# Patient Record
Sex: FEMALE | Race: WHITE | NOT HISPANIC OR LATINO | Employment: OTHER | URBAN - METROPOLITAN AREA
[De-identification: names, ages, dates, MRNs, and addresses within clinical notes are randomized per-mention and may not be internally consistent; named-entity substitution may affect disease eponyms.]

---

## 2017-01-23 RX ORDER — METRONIDAZOLE 500 MG/1
500 TABLET ORAL 2 TIMES DAILY
Qty: 14 TABLET | Refills: 0 | Status: SHIPPED | OUTPATIENT
Start: 2017-01-23 | End: 2017-01-30

## 2017-01-23 NOTE — TELEPHONE ENCOUNTER
Pt notified rx has been sent to pharmacy.  Advised no alcohol consumption while taking medication.  Aware if not better after taking medication she will need an appt.

## 2017-01-23 NOTE — TELEPHONE ENCOUNTER
Pt states she has an odor.  Last time this happened she was given Flagyl.  She does not have insurance until Feb 1.  Scheduled annual 3/1.

## 2017-04-05 ENCOUNTER — OFFICE VISIT (OUTPATIENT)
Dept: OBSTETRICS AND GYNECOLOGY | Facility: CLINIC | Age: 52
End: 2017-04-05
Payer: COMMERCIAL

## 2017-04-05 VITALS
BODY MASS INDEX: 31.57 KG/M2 | SYSTOLIC BLOOD PRESSURE: 114 MMHG | HEIGHT: 65 IN | DIASTOLIC BLOOD PRESSURE: 72 MMHG | WEIGHT: 189.5 LBS

## 2017-04-05 DIAGNOSIS — Z11.51 SCREENING FOR HPV (HUMAN PAPILLOMAVIRUS): ICD-10-CM

## 2017-04-05 DIAGNOSIS — Z12.4 SCREENING FOR CERVICAL CANCER: ICD-10-CM

## 2017-04-05 DIAGNOSIS — Z01.419 ENCOUNTER FOR GYNECOLOGICAL EXAMINATION: Primary | ICD-10-CM

## 2017-04-05 DIAGNOSIS — R10.32 LLQ PAIN: ICD-10-CM

## 2017-04-05 DIAGNOSIS — N76.0 VULVOVAGINITIS: ICD-10-CM

## 2017-04-05 DIAGNOSIS — Z12.31 VISIT FOR SCREENING MAMMOGRAM: ICD-10-CM

## 2017-04-05 PROCEDURE — 99396 PREV VISIT EST AGE 40-64: CPT | Mod: S$GLB,,, | Performed by: OBSTETRICS & GYNECOLOGY

## 2017-04-05 PROCEDURE — 87480 CANDIDA DNA DIR PROBE: CPT

## 2017-04-05 PROCEDURE — 87624 HPV HI-RISK TYP POOLED RSLT: CPT

## 2017-04-05 PROCEDURE — 99999 PR PBB SHADOW E&M-EST. PATIENT-LVL II: CPT | Mod: PBBFAC,,, | Performed by: OBSTETRICS & GYNECOLOGY

## 2017-04-05 PROCEDURE — 88175 CYTOPATH C/V AUTO FLUID REDO: CPT

## 2017-04-05 RX ORDER — FEXOFENADINE HCL AND PSEUDOEPHEDRINE HCI 180; 240 MG/1; MG/1
1 TABLET, EXTENDED RELEASE ORAL
COMMUNITY

## 2017-04-05 RX ORDER — MOMETASONE FUROATE 1 MG/G
CREAM TOPICAL
Refills: 2 | COMMUNITY
Start: 2017-02-21 | End: 2018-08-24

## 2017-04-05 RX ORDER — LEVOTHYROXINE SODIUM 175 UG/1
TABLET ORAL
Refills: 5 | COMMUNITY
Start: 2017-03-03 | End: 2017-11-27

## 2017-04-05 RX ORDER — IBUPROFEN 200 MG
200 TABLET ORAL EVERY 6 HOURS PRN
COMMUNITY
End: 2019-01-15

## 2017-04-05 NOTE — MR AVS SNAPSHOT
[facility]  [address]  [place] [address]  Phone: [phone]  Fax: [phone]                  Lola Self   2017 10:30 AM   Office Visit    Description:  Female : 1965   Provider:  Luna Mcnamara MD   Department:  [facility]           Reason for Visit     Well Woman           Diagnoses this Visit        Comments    Encounter for gynecological examination    -  Primary     Screening for cervical cancer         Screening for HPV (human papillomavirus)         Visit for screening mammogram         LLQ pain         Vulvovaginitis                To Do List           Future Appointments        Provider Department Dept Phone    4/10/2017 12:00 PM METH MAMMO1 [facility] [phone]    2017 1:00 PM [facility], WOMEN'S ULTRASOUND [facility] Ultrasound [phone]    2017 2:15 PM Luna Mcnamara MD [facility] [phone]      Goals (5 Years of Data)     None      Follow-Up and Disposition     Return in about 1 year (around 2018).    Follow-up and Disposition History      [facility]     [facility] On Call Nurse Care Line -  Assistance  Unless otherwise directed by your provider, please contact [facility], our nurse care line that is available for  assistance.     Registered nurses in the [facility] provide: appointment scheduling, clinical advisement, health education, and other advisory services.  Call: [phone] (toll free)               Medications           Message regarding Medications     Verify the changes and/or additions to your medication regime listed below are the same as discussed with your clinician today.  If any of these changes or additions are incorrect, please notify your healthcare provider.             Verify that the below list of medications is an accurate representation of the medications you are currently taking.  If none reported, the  "list may be blank. If incorrect, please contact your healthcare provider. Carry this list with you in case of emergency.           Current Medications     cyclobenzaprine (FLEXERIL) 5 MG tablet 1-2 tabs po TID prn pain    fexofenadine-pseudoephedrine (ALLEGRA-D 24) 180-240 mg per 24 hr tablet Take 1 tablet by mouth once daily.    hydroxychloroquine (PLAQUENIL) 200 mg tablet Take 200 mg by mouth 2 (two) times daily.    ibuprofen (ADVIL,MOTRIN) 200 MG tablet Take 200 mg by mouth every 6 (six) hours as needed for Pain.    levothyroxine (SYNTHROID, LEVOTHROID) 175 MCG tablet TK ONE T PO QD    mometasone 0.1% (ELOCON) 0.1 % cream APPLY 2 TIMES A DAY TO EARS AND NECK AS NEEDED FOR REDNESS AND SCALING. USE SPARINGLY.           Clinical Reference Information           Your Vitals Were     BP Height Weight Last Period BMI    114/72 5' 5" (1.651 m) 86 kg (189 lb 7.8 oz) 03/08/2017 (Exact Date) 31.53 kg/m2      Blood Pressure          Most Recent Value    BP  114/72      Allergies as of 4/5/2017     Morphine      Immunizations Administered on Date of Encounter - 4/5/2017     None      Orders Placed During Today's Visit      Normal Orders This Visit    HPV DNA probe, amplified     Liquid-based pap smear, screening     Vaginosis Screen by DNA Probe     Future Labs/Procedures Expected by Expires    US Pelvis Comp with Transvag NON-OB (xpd  4/5/2017 4/5/2018      Language Assistance Services     ATTENTION: Language assistance services are available, free of charge. Please call [phone].      ATENCIÓN: Si habla negrito, tiene a song disposición servicios gratuitos de asistencia lingüística. Llame al [phone].     [facility] Ý: N?u b?n nói Ti?ng Vi?t, có các d?ch v? h? tr? ngôn ng? mi?n phí dành cho b?n. G?i s? [phone].         [facility] complies with applicable Federal civil rights laws and does not discriminate on the basis of race, color, national origin, age, disability, or sex.        "

## 2017-04-05 NOTE — PROGRESS NOTES
Subjective:       Patient ID: Lola Self is a 51 y.o. female.    Chief Complaint:  Well Woman (Annual Exam  --  Last Pap 9-22-15, Negative/ Hpv Neg   ---   MMG , Normal   ---  )      History of Present Illness.  Lola Self is a 51 y.o. female.  She has no breast or urinary symptoms.  She has no menorrhagia, oligomenorrhea, bleeding betweeen menses, postcoital bleeding, dysmenorrhea, pelvic pain, dyspareunia, vaginal dryness, vaginal discharge, or sexual complaints.  She complians of LLQ pressure off/on x 1 year.  No aggravating/alleviating factors.  No other symptoms.  She also says she thinks she may have BV again.  Recently self treated.    GYN & OB History  Patient's last menstrual period was 2017 (exact date).   Date of Last Pap: 9/22/15 Normal HPV negative  Date of last mammogram: 9/30/15 Normal    OB History    Para Term  AB SAB TAB Ectopic Multiple Living   2 2 2 0 0 0 0 0  2      # Outcome Date GA Lbr Taj/2nd Weight Sex Delivery Anes PTL Lv   2 Term 12/15/04 39w0d  4.139 kg (9 lb 2 oz) M CS-LTranv Spinal  Y   1 Term 97 43w0d  4.196 kg (9 lb 4 oz) M Vag-Spont Spinal  Y      Obstetric Comments   Menarche ~ 16       Past Medical History:   Diagnosis Date    Abnormal Pap smear of cervix 2014    Hpv +     Acid reflux     DUB (dysfunctional uterine bleeding)     History of endometriosis     History of HPV infection 2014    Pap Hpv +    Neck and shoulder pain     Rheumatoid arthritis     Seasonal allergies     Thyroid disease     Hypo     Past Surgical History:   Procedure Laterality Date    APPENDECTOMY  1985     SECTION  , 2004    x 2     CHOLECYSTECTOMY  2009    DILATION AND CURETTAGE OF UTERUS  08/15/2013    DUB    ENDOMETRIAL ABLATION N/A 08/15/2013    [facility] -- Dr Mcnamara    ENDOMETRIAL BIOPSY      PELVIC LAPAROSCOPY  1994 and   Dr Ferro    TUBAL LIGATION  2004     Family History   Problem  Relation Age of Onset    Hypertension Maternal Grandmother     Heart disease Maternal Grandmother     Hypertension Sister     Hypertension Brother     Heart disease Maternal Grandfather 42     death due to MI    Cancer Paternal Grandmother     Cancer Paternal Grandfather      liver cancer    Stroke Brother     Seizures Mother     Hypertension Mother     Thyroid disease Mother     Alzheimer's disease Mother     Cancer Father      melanoma    Heart disease Father     Hyperlipidemia Father     Melanoma Father     Lung cancer Father     Breast cancer Neg Hx     Colon cancer Neg Hx     Ovarian cancer Neg Hx      Social History   Substance Use Topics    Smoking status: Never Smoker    Smokeless tobacco: Never Used    Alcohol use 0.6 oz/week     1 Glasses of wine, 0 Standard drinks or equivalent per week      Comment: socially       Current Outpatient Prescriptions:     cyclobenzaprine (FLEXERIL) 5 MG tablet, 1-2 tabs po TID prn pain, Disp: 30 tablet, Rfl: 2    fexofenadine-pseudoephedrine (ALLEGRA-D 24) 180-240 mg per 24 hr tablet, Take 1 tablet by mouth once daily., Disp: , Rfl:     hydroxychloroquine (PLAQUENIL) 200 mg tablet, Take 200 mg by mouth 2 (two) times daily., Disp: , Rfl:     ibuprofen (ADVIL,MOTRIN) 200 MG tablet, Take 200 mg by mouth every 6 (six) hours as needed for Pain., Disp: , Rfl:     levothyroxine (SYNTHROID, LEVOTHROID) 175 MCG tablet, TK ONE T PO QD, Disp: , Rfl: 5    mometasone 0.1% (ELOCON) 0.1 % cream, APPLY 2 TIMES A DAY TO EARS AND NECK AS NEEDED FOR REDNESS AND SCALING. USE SPARINGLY., Disp: , Rfl: 2    Review of patient's allergies indicates:   Allergen Reactions    Morphine Shortness Of Breath     Other reaction(s): Asthma/Shortness of Breath       Review of Systems  Review of Systems   Constitutional: Negative for fatigue.   HENT: Negative for trouble swallowing.    Eyes: Negative for visual disturbance.   Respiratory: Negative for cough and shortness of  "breath.    Cardiovascular: Negative for chest pain.   Gastrointestinal: Negative for abdominal distention, abdominal pain, blood in stool, nausea and vomiting.   Genitourinary: Negative for difficulty urinating, dyspareunia, dysuria, flank pain, frequency, hematuria, pelvic pain, urgency, vaginal bleeding, vaginal discharge and vaginal pain.   Musculoskeletal: Negative for arthralgias.   Skin: Negative for rash.   Neurological: Negative for dizziness and headaches.   Psychiatric/Behavioral: Negative for sleep disturbance. The patient is not nervous/anxious.         Objective:     Vitals:    04/05/17 1102   BP: 114/72   Weight: 86 kg (189 lb 7.8 oz)   Height: 5' 5" (1.651 m)     Body mass index is 31.53 kg/(m^2).    Physical Exam:   Constitutional: She is oriented to person, place, and time. Vital signs are normal. She appears well-developed and well-nourished.    HENT:   Head: Normocephalic.     Neck: Normal range of motion. No thyromegaly present.     Pulmonary/Chest: Right breast exhibits no mass, no nipple discharge, no skin change, no tenderness and no swelling. Left breast exhibits no mass, no nipple discharge, no skin change, no tenderness and no swelling. Breasts are symmetrical.        Abdominal: Soft. Normal appearance and bowel sounds are normal. She exhibits no distension. There is no tenderness.     Genitourinary: Vagina normal and uterus normal. Pelvic exam was performed with patient supine. There is no rash, tenderness, lesion or injury on the right labia. There is no rash, tenderness, lesion or injury on the left labia. Cervix is normal. Right adnexum displays no mass, no tenderness and no fullness. Left adnexum displays no mass, no tenderness and no fullness. No erythema in the vagina. No vaginal discharge found. Cervix exhibits no motion tenderness and no discharge.           Musculoskeletal: Normal range of motion.      Lymphadenopathy:        Right: No inguinal and no supraclavicular adenopathy " present.        Left: No inguinal and no supraclavicular adenopathy present.    Neurological: She is alert and oriented to person, place, and time.    Skin: Skin is warm and dry.    Psychiatric: She has a normal mood and affect.        Assessment/ Plan:   Encounter for gynecological examination    Screening for cervical cancer  -     Liquid-based pap smear, screening    Screening for HPV (human papillomavirus)  -     HPV DNA probe, amplified    Visit for screening mammogram    LLQ pain  -     US Pelvis Comp with Transvag NON-OB (xpd; Future; Expected date: 4/5/17    Vulvovaginitis  -     Vaginosis Screen by DNA Probe        Routine pap smears.  Self breast exam and routine mammography discussed.  Diet and exercise discussed.  Contraception reviewed/discussed.  Follow-up with me in 1 year.

## 2017-04-06 LAB
CANDIDA RRNA VAG QL PROBE: NEGATIVE
G VAGINALIS RRNA GENITAL QL PROBE: NEGATIVE
T VAGINALIS RRNA GENITAL QL PROBE: NEGATIVE

## 2017-04-10 ENCOUNTER — HOSPITAL ENCOUNTER (OUTPATIENT)
Dept: RADIOLOGY | Facility: HOSPITAL | Age: 52
Discharge: HOME | End: 2017-04-10
Attending: INTERNAL MEDICINE
Payer: COMMERCIAL

## 2017-04-10 DIAGNOSIS — Z12.31 OTHER SCREENING MAMMOGRAM: ICD-10-CM

## 2017-04-10 PROCEDURE — 77067 SCR MAMMO BI INCL CAD: CPT | Mod: TC

## 2017-04-10 PROCEDURE — 77063 BREAST TOMOSYNTHESIS BI: CPT | Mod: 26,,, | Performed by: RADIOLOGY

## 2017-04-10 PROCEDURE — 77067 SCR MAMMO BI INCL CAD: CPT | Mod: 26,,, | Performed by: RADIOLOGY

## 2017-04-11 LAB
HPV16 DNA SPEC QL NAA+PROBE: NEGATIVE
HPV16+18+H RISK 12 DNA CVX-IMP: NEGATIVE
HPV18 DNA SPEC QL NAA+PROBE: NEGATIVE

## 2017-04-25 ENCOUNTER — OFFICE VISIT (OUTPATIENT)
Dept: OBSTETRICS AND GYNECOLOGY | Facility: CLINIC | Age: 52
End: 2017-04-25
Payer: COMMERCIAL

## 2017-04-25 VITALS
WEIGHT: 185 LBS | HEIGHT: 65 IN | SYSTOLIC BLOOD PRESSURE: 120 MMHG | DIASTOLIC BLOOD PRESSURE: 70 MMHG | BODY MASS INDEX: 30.82 KG/M2

## 2017-04-25 DIAGNOSIS — R10.32 LLQ PAIN: Primary | ICD-10-CM

## 2017-04-25 DIAGNOSIS — D25.1 FIBROIDS, INTRAMURAL: ICD-10-CM

## 2017-04-25 LAB
BILIRUB SERPL-MCNC: NORMAL MG/DL
BLOOD URINE, POC: NORMAL
COLOR, POC UA: NORMAL
GLUCOSE UR QL STRIP: NORMAL
KETONES UR QL STRIP: NORMAL
LEUKOCYTE ESTERASE URINE, POC: NORMAL
NITRITE, POC UA: NORMAL
PH, POC UA: 5
PROTEIN, POC: NORMAL
SPECIFIC GRAVITY, POC UA: 1
UROBILINOGEN, POC UA: NORMAL

## 2017-04-25 PROCEDURE — 81002 URINALYSIS NONAUTO W/O SCOPE: CPT | Mod: S$GLB,,, | Performed by: OBSTETRICS & GYNECOLOGY

## 2017-04-25 PROCEDURE — 99999 PR PBB SHADOW E&M-EST. PATIENT-LVL III: CPT | Mod: PBBFAC,,, | Performed by: OBSTETRICS & GYNECOLOGY

## 2017-04-25 PROCEDURE — 99213 OFFICE O/P EST LOW 20 MIN: CPT | Mod: 25,S$GLB,, | Performed by: OBSTETRICS & GYNECOLOGY

## 2017-04-25 PROCEDURE — 1160F RVW MEDS BY RX/DR IN RCRD: CPT | Mod: S$GLB,,, | Performed by: OBSTETRICS & GYNECOLOGY

## 2017-04-25 NOTE — PROGRESS NOTES
Pt is 51 y.o. here for evaluation of pelvic pain. The pain is described as pulling, and is 4/10 in intensity. Pain is located in the LLQ area without radiation.   It occurs off and on but she is able to function. Onset was gradual occurring 1 year ago. Symptoms have been unchanged since. Aggravating factors: none. Alleviating factors: none. Associated symptoms: none. The patient denies constipation, diarrhea, fever, nausea and vomiting. Risk factors for pelvic/abdominal pain include prior pelvic surgery and a history of endometriosis.  Patient's last menstrual period was 2017.  Ultrasound today shows 2 small intramural fibroids only.  UA negative today.    OB History    Para Term  AB SAB TAB Ectopic Multiple Living   2 2 2 0 0 0 0 0  2      # Outcome Date GA Lbr Taj/2nd Weight Sex Delivery Anes PTL Lv   2 Term 12/15/04 39w0d  4.139 kg (9 lb 2 oz) M CS-LTranv Spinal  Y   1 Term 97 43w0d  4.196 kg (9 lb 4 oz) M Vag-Spont Spinal  Y      Obstetric Comments   Menarche ~ 16     Past Medical History:   Diagnosis Date    Abnormal Pap smear of cervix 2014    Hpv +     Acid reflux     DUB (dysfunctional uterine bleeding)     History of endometriosis     History of HPV infection 2014    Pap Hpv +    Neck and shoulder pain     Rheumatoid arthritis     Seasonal allergies     Thyroid disease     Hypo     Past Surgical History:   Procedure Laterality Date    APPENDECTOMY  1985     SECTION  , 2004    x 2     CHOLECYSTECTOMY  2009    DILATION AND CURETTAGE OF UTERUS  08/15/2013    DUB    ENDOMETRIAL ABLATION N/A 08/15/2013    [facility] -- Dr Mcnamara    ENDOMETRIAL BIOPSY      PELVIC LAPAROSCOPY  1994 and   Dr Ferro    TUBAL LIGATION       Family History   Problem Relation Age of Onset    Hypertension Maternal Grandmother     Heart disease Maternal Grandmother     Hypertension Sister     Hypertension Brother     Heart disease Maternal  Grandfather 42     death due to MI    Cancer Paternal Grandmother     Cancer Paternal Grandfather      liver cancer    Stroke Brother     Seizures Mother     Hypertension Mother     Thyroid disease Mother     Alzheimer's disease Mother     Cancer Father      melanoma    Heart disease Father     Hyperlipidemia Father     Melanoma Father     Lung cancer Father     Breast cancer Neg Hx     Colon cancer Neg Hx     Ovarian cancer Neg Hx      Social History     Social History    Marital status:      Spouse name: N/A    Number of children: 2    Years of education: N/A     Occupational History          Social History Main Topics    Smoking status: Never Smoker    Smokeless tobacco: Never Used    Alcohol use 0.6 oz/week     1 Glasses of wine, 0 Standard drinks or equivalent per week      Comment: socially    Drug use: No    Sexual activity: Yes     Partners: Male     Birth control/ protection: Surgical      Comment: Engaged:  Darius        (BTL 2004)     Other Topics Concern    None     Social History Narrative    ** Merged History Encounter **          Review of patient's allergies indicates:   Allergen Reactions    Morphine Shortness Of Breath     Other reaction(s): Asthma/Shortness of Breath     Current Outpatient Prescriptions on File Prior to Visit   Medication Sig Dispense Refill    cyclobenzaprine (FLEXERIL) 5 MG tablet 1-2 tabs po TID prn pain 30 tablet 2    fexofenadine-pseudoephedrine (ALLEGRA-D 24) 180-240 mg per 24 hr tablet Take 1 tablet by mouth once daily.      hydroxychloroquine (PLAQUENIL) 200 mg tablet Take 200 mg by mouth 2 (two) times daily.      ibuprofen (ADVIL,MOTRIN) 200 MG tablet Take 200 mg by mouth every 6 (six) hours as needed for Pain.      levothyroxine (SYNTHROID, LEVOTHROID) 175 MCG tablet TK ONE T PO QD  5    mometasone 0.1% (ELOCON) 0.1 % cream APPLY 2 TIMES A DAY TO EARS AND NECK AS NEEDED FOR REDNESS AND SCALING. USE SPARINGLY.  2     No  "current facility-administered medications on file prior to visit.        Review of Systems:  General: No fever, chills, fatigue or weight loss.  Chest: No chest pain, shortness of breath, or palpitations.  Breast: No pain, masses, or nipple discharge.  Vulva: No pain, lesions, or itching.  Vagina: No relaxation, pain, itching, discharge, or lesions.  Abdomen: No pain, nausea, vomiting, diarrhea, or constipation.  Urinary: No incontinence, nocturia, frequency, or dysuria.  Extremities:  No leg cramps, edema, or calf pain.  Neurologic: No headaches, dizziness, or visual changes.    Vitals:  Vitals:    04/25/17 1318   BP: 120/70   Weight: 83.9 kg (185 lb)   Height: 5' 5" (1.651 m)   PainSc: 0-No pain     Body mass index is 30.79 kg/(m^2).    Assessment and Plan:  LLQ pain  -     POCT URINE DIPSTICK WITHOUT MICROSCOPE    Fibroids, intramural    We discussed GC/CT cultures to rule out source of pain, but patient is  and feels it is unnecessary.  No obvious GYN source at this time.  It could be related to previous pelvic surgery or endometriosis.  Pain is not severe enough to warrant laparoscopy at this time.  Refer to GI.  Recommend ibuprofen which she already takes prn.  F/U prn.    "

## 2017-04-25 NOTE — MR AVS SNAPSHOT
[facility]  [address]  [place] [address]  Phone: [phone]  Fax: [phone]                  Lola Self   2017 2:15 PM   Office Visit    Description:  Female : 1965   Provider:  Luna Mcnamara MD   Department:  [facility]           Diagnoses this Visit        Comments    LLQ pain    -  Primary     Fibroids, intramural                To Do List           Goals (5 Years of Data)     None      [facility]     [facility] On Call Nurse Care Line -  Assistance  Unless otherwise directed by your provider, please contact [facility], our nurse care line that is available for  assistance.     Registered nurses in the [facility] provide: appointment scheduling, clinical advisement, health education, and other advisory services.  Call: [phone] (toll free)               Medications           Message regarding Medications     Verify the changes and/or additions to your medication regime listed below are the same as discussed with your clinician today.  If any of these changes or additions are incorrect, please notify your healthcare provider.             Verify that the below list of medications is an accurate representation of the medications you are currently taking.  If none reported, the list may be blank. If incorrect, please contact your healthcare provider. Carry this list with you in case of emergency.           Current Medications     cyclobenzaprine (FLEXERIL) 5 MG tablet 1-2 tabs po TID prn pain    fexofenadine-pseudoephedrine (ALLEGRA-D 24) 180-240 mg per 24 hr tablet Take 1 tablet by mouth once daily.    hydroxychloroquine (PLAQUENIL) 200 mg tablet Take 200 mg by mouth 2 (two) times daily.    ibuprofen (ADVIL,MOTRIN) 200 MG tablet Take 200 mg by mouth every 6 (six) hours as needed for Pain.    levothyroxine (SYNTHROID, LEVOTHROID) 175 MCG tablet TK ONE T PO QD    mometasone 0.1% (ELOCON) 0.1 % cream  "APPLY 2 TIMES A DAY TO EARS AND NECK AS NEEDED FOR REDNESS AND SCALING. USE SPARINGLY.           Clinical Reference Information           Your Vitals Were     BP Height Weight Last Period BMI    120/70 5' 5" (1.651 m) 83.9 kg (185 lb) 04/12/2017 30.79 kg/m2      Blood Pressure          Most Recent Value    BP  120/70      Allergies as of 4/25/2017     Morphine      Immunizations Administered on Date of Encounter - 4/25/2017     None      Orders Placed During Today's Visit      Normal Orders This Visit    POCT URINE DIPSTICK WITHOUT MICROSCOPE          4/25/2017  1:41 PM - Fernanda Orr MA      Component Results     Component    Color, UA    light yellow    Spec Grav UA    1.000    pH, UA    5.0    WBC, UA    Neg    Nitrite, UA    Neg    Protein    Neg    Glucose, UA    Neg    Ketones, UA    Neg    Urobilinogen, UA    Neg    Bilirubin    Neg    Blood, UA    Neg            Language Assistance Services     ATTENTION: Language assistance services are available, free of charge. Please call [phone].      ATENCIÓN: Si habla español, tiene a song disposición servicios gratuitos de asistencia lingüística. Llame al [phone].     [facility] Ý: N?u b?n nói Ti?ng Vi?t, có các d?ch v? h? tr? ngôn ng? mi?n phí dành cho b?n. G?i s? [phone].         [facility] complies with applicable Federal civil rights laws and does not discriminate on the basis of race, color, national origin, age, disability, or sex.        "

## 2018-09-09 ENCOUNTER — OFFICE VISIT (OUTPATIENT)
Dept: URGENT CARE | Facility: CLINIC | Age: 53
End: 2018-09-09
Payer: COMMERCIAL

## 2018-09-09 VITALS
RESPIRATION RATE: 16 BRPM | WEIGHT: 186 LBS | BODY MASS INDEX: 30.99 KG/M2 | DIASTOLIC BLOOD PRESSURE: 79 MMHG | SYSTOLIC BLOOD PRESSURE: 121 MMHG | HEART RATE: 87 BPM | OXYGEN SATURATION: 97 % | TEMPERATURE: 98 F | HEIGHT: 65 IN

## 2018-09-09 DIAGNOSIS — J32.9 SINUSITIS, UNSPECIFIED CHRONICITY, UNSPECIFIED LOCATION: Primary | ICD-10-CM

## 2018-09-09 PROCEDURE — 96372 THER/PROPH/DIAG INJ SC/IM: CPT | Mod: S$GLB,,, | Performed by: FAMILY MEDICINE

## 2018-09-09 PROCEDURE — 3008F BODY MASS INDEX DOCD: CPT | Mod: CPTII,S$GLB,, | Performed by: FAMILY MEDICINE

## 2018-09-09 PROCEDURE — 99214 OFFICE O/P EST MOD 30 MIN: CPT | Mod: 25,S$GLB,, | Performed by: FAMILY MEDICINE

## 2018-09-09 RX ORDER — AZELASTINE 1 MG/ML
1 SPRAY, METERED NASAL 2 TIMES DAILY
Qty: 30 ML | Refills: 6 | Status: SHIPPED | OUTPATIENT
Start: 2018-09-09 | End: 2019-01-15

## 2018-09-09 RX ORDER — BETAMETHASONE SODIUM PHOSPHATE AND BETAMETHASONE ACETATE 3; 3 MG/ML; MG/ML
9 INJECTION, SUSPENSION INTRA-ARTICULAR; INTRALESIONAL; INTRAMUSCULAR; SOFT TISSUE ONCE
Status: COMPLETED | OUTPATIENT
Start: 2018-09-09 | End: 2018-09-09

## 2018-09-09 RX ORDER — CEFDINIR 300 MG/1
300 CAPSULE ORAL 2 TIMES DAILY
Qty: 20 CAPSULE | Refills: 0 | Status: SHIPPED | OUTPATIENT
Start: 2018-09-09 | End: 2018-09-19

## 2018-09-09 RX ADMIN — BETAMETHASONE SODIUM PHOSPHATE AND BETAMETHASONE ACETATE 9 MG: 3; 3 INJECTION, SUSPENSION INTRA-ARTICULAR; INTRALESIONAL; INTRAMUSCULAR; SOFT TISSUE at 04:09

## 2018-09-09 NOTE — PROGRESS NOTES
"Subjective:       Patient ID: Lola Self is a 53 y.o. female.    Vitals:  height is 5' 5" (1.651 m) and weight is 84.4 kg (186 lb). Her oral temperature is 97.6 °F (36.4 °C). Her blood pressure is 121/79 and her pulse is 87. Her respiration is 16 and oxygen saturation is 97%.     Chief Complaint: Sore Throat    Sore throat, cough, and nasal congestion      Sore Throat    This is a new problem. The current episode started in the past 7 days. The problem has been gradually worsening. Neither side of throat is experiencing more pain than the other. The fever has been present for 1 to 2 days. Associated symptoms include coughing, a hoarse voice and swollen glands. Pertinent negatives include no abdominal pain, congestion, ear pain, headaches or shortness of breath. She has tried NSAIDs for the symptoms. The treatment provided no relief.     Review of Systems   Constitution: Positive for decreased appetite. Negative for chills, fever and malaise/fatigue.   HENT: Positive for hoarse voice and sore throat. Negative for congestion and ear pain.    Eyes: Negative for discharge and redness.   Cardiovascular: Negative for chest pain, dyspnea on exertion and leg swelling.   Respiratory: Positive for cough. Negative for shortness of breath, sputum production and wheezing.    Musculoskeletal: Negative for myalgias.   Gastrointestinal: Negative for abdominal pain and nausea.   Neurological: Negative for headaches.       Objective:      Physical Exam   Constitutional: She is oriented to person, place, and time. She appears well-developed and well-nourished. She is cooperative.  Non-toxic appearance. She does not appear ill. No distress.   HENT:   Head: Normocephalic and atraumatic.   Right Ear: Hearing, external ear and ear canal normal. A middle ear effusion is present.   Left Ear: Hearing, external ear and ear canal normal. A middle ear effusion is present.   Nose: Mucosal edema and rhinorrhea present. No nasal " deformity. No epistaxis. Right sinus exhibits maxillary sinus tenderness. Right sinus exhibits no frontal sinus tenderness. Left sinus exhibits maxillary sinus tenderness. Left sinus exhibits no frontal sinus tenderness.   Mouth/Throat: Uvula is midline, oropharynx is clear and moist and mucous membranes are normal. No trismus in the jaw. Normal dentition. No uvula swelling. No posterior oropharyngeal erythema.   Eyes: Conjunctivae and lids are normal. No scleral icterus.   Sclera clear bilat   Neck: Trachea normal, full passive range of motion without pain and phonation normal. Neck supple.   Cardiovascular: Normal rate, regular rhythm, normal heart sounds, intact distal pulses and normal pulses.   Pulmonary/Chest: Effort normal and breath sounds normal. No respiratory distress.   Abdominal: Normal appearance. She exhibits no distension. There is no tenderness.   Musculoskeletal: Normal range of motion. She exhibits no edema or deformity.   Neurological: She is alert and oriented to person, place, and time. She exhibits normal muscle tone. Coordination normal.   Skin: Skin is warm, dry and intact. She is not diaphoretic. No pallor.   Psychiatric: She has a normal mood and affect. Her speech is normal and behavior is normal. Judgment and thought content normal. Cognition and memory are normal.   Nursing note and vitals reviewed.      Assessment:       1. Sinusitis, unspecified chronicity, unspecified location        Plan:         Sinusitis, unspecified chronicity, unspecified location    Other orders  -     cefdinir (OMNICEF) 300 MG capsule; Take 1 capsule (300 mg total) by mouth 2 (two) times daily. for 10 days  Dispense: 20 capsule; Refill: 0  -     betamethasone acetate-betamethasone sodium phosphate injection 9 mg; Inject 1.5 mLs (9 mg total) into the muscle once.  -     azelastine (ASTELIN) 137 mcg (0.1 %) nasal spray; 1 spray (137 mcg total) by Nasal route 2 (two) times daily.  Dispense: 30 mL; Refill: 6          Pt states that she always receives a steroid shot, abx shot and oral abx from her ENT. Advised a wait and see approach as sx are likely viral or allergy related

## 2018-09-12 ENCOUNTER — TELEPHONE (OUTPATIENT)
Dept: URGENT CARE | Facility: CLINIC | Age: 53
End: 2018-09-12

## 2018-09-12 ENCOUNTER — OFFICE VISIT (OUTPATIENT)
Dept: OBSTETRICS AND GYNECOLOGY | Facility: CLINIC | Age: 53
End: 2018-09-12
Payer: COMMERCIAL

## 2018-09-12 VITALS
SYSTOLIC BLOOD PRESSURE: 118 MMHG | DIASTOLIC BLOOD PRESSURE: 78 MMHG | HEIGHT: 65 IN | WEIGHT: 187.38 LBS | BODY MASS INDEX: 31.22 KG/M2

## 2018-09-12 DIAGNOSIS — Z01.419 ENCOUNTER FOR GYNECOLOGICAL EXAMINATION: ICD-10-CM

## 2018-09-12 DIAGNOSIS — Z12.31 ENCOUNTER FOR SCREENING MAMMOGRAM FOR BREAST CANCER: Primary | ICD-10-CM

## 2018-09-12 DIAGNOSIS — Z12.31 VISIT FOR SCREENING MAMMOGRAM: ICD-10-CM

## 2018-09-12 PROCEDURE — 99499 UNLISTED E&M SERVICE: CPT | Mod: S$GLB,,, | Performed by: OBSTETRICS & GYNECOLOGY

## 2018-09-12 PROCEDURE — 99999 PR PBB SHADOW E&M-EST. PATIENT-LVL III: CPT | Mod: PBBFAC,,, | Performed by: OBSTETRICS & GYNECOLOGY

## 2018-09-12 NOTE — PROGRESS NOTES
Subjective:       Patient ID: Lola Mann is a 53 y.o. female.    Chief Complaint:  Well Woman (Annual Exam  --  Last Pap/HPV 17,Negative  --  Last MMG 4-10-17, Normal   --  Colonosocpy 2017, Normal )      History of Present Illness.  Lola Mann is a 53 y.o. female.  She has no breast or urinary symptoms.  She has no postcoital bleeding, pelvic pain or vaginal discharge.    GYN & OB History  Patient's last menstrual period was 2018 (exact date).   Pap: 2017 Normal HPV neg  Mammogram: 4/10/17 Normal  Colonoscopy:  Normal  DEXA: No    OB History    Para Term  AB Living   2 2 2 0 0 2   SAB TAB Ectopic Multiple Live Births   0 0 0   2      # Outcome Date GA Lbr Taj/2nd Weight Sex Delivery Anes PTL Lv   2 Term 12/15/04 39w0d  4.139 kg (9 lb 2 oz) M CS-LTranv Spinal  EARLENE   1 Term 97 43w0d  4.196 kg (9 lb 4 oz) M Vag-Spont Spinal  EARLENE      Obstetric Comments   Menarche ~ 16       Past Medical History:   Diagnosis Date    Abnormal Pap smear of cervix 2014    Hpv +     Acid reflux     DUB (dysfunctional uterine bleeding)     History of endometriosis     History of HPV infection 2014    Pap Hpv +    Neck and shoulder pain     Rheumatoid arthritis     Seasonal allergies     Thyroid disease     Hypo    Venous insufficiency of leg      Past Surgical History:   Procedure Laterality Date    APPENDECTOMY  1985     SECTION  , 2004    x 2     CHOLECYSTECTOMY  2009    COLONOSCOPY  2017    Normal  w/ Dr Moore  (Rtn 10 yrs)    DILATION AND CURETTAGE OF UTERUS  08/15/2013    DUB    ENDOMETRIAL ABLATION N/A 08/15/2013    [facility] -- Dr Mcnamara    ENDOMETRIAL BIOPSY      LASER ABLATION  2017    Endovenous Laser Ablation-Leg    PELVIC LAPAROSCOPY  1994 and   Dr Ferro    TUBAL LIGATION  2004    VARICOSE VEIN SURGERY       Family History   Problem Relation Age of Onset    Hypertension Maternal Grandmother      Heart disease Maternal Grandmother     Hypertension Sister     Hypertension Brother     Heart disease Maternal Grandfather 42        death due to MI    Cancer Paternal Grandmother     Cancer Paternal Grandfather         liver cancer    Stroke Brother     Seizures Mother     Hypertension Mother     Thyroid disease Mother     Alzheimer's disease Mother     Cancer Father         melanoma    Heart disease Father     Hyperlipidemia Father     Melanoma Father     Lung cancer Father     Breast cancer Neg Hx     Colon cancer Neg Hx     Ovarian cancer Neg Hx      Social History     Tobacco Use    Smoking status: Never Smoker    Smokeless tobacco: Never Used   Substance Use Topics    Alcohol use: Yes     Alcohol/week: 0.6 oz     Types: 1 Glasses of wine per week     Frequency: 2-4 times a month     Drinks per session: 3 or 4     Binge frequency: Never     Comment: social    Drug use: No       Current Outpatient Medications:     ARMOUR THYROID 120 mg Tab, Take 1 tablet by mouth once daily., Disp: , Rfl: 3    azelastine (ASTELIN) 137 mcg (0.1 %) nasal spray, 1 spray (137 mcg total) by Nasal route 2 (two) times daily., Disp: 30 mL, Rfl: 6    cefdinir (OMNICEF) 300 MG capsule, Take 1 capsule (300 mg total) by mouth 2 (two) times daily. for 10 days, Disp: 20 capsule, Rfl: 0    cyclobenzaprine (FLEXERIL) 5 MG tablet, 1-2 tabs po TID prn pain, Disp: 30 tablet, Rfl: 2    fexofenadine-pseudoephedrine (ALLEGRA-D 24) 180-240 mg per 24 hr tablet, Take 1 tablet by mouth as needed. , Disp: , Rfl:     hydroxychloroquine (PLAQUENIL) 200 mg tablet, Take 200 mg by mouth once daily. , Disp: , Rfl:     ibuprofen (ADVIL,MOTRIN) 200 MG tablet, Take 200 mg by mouth every 6 (six) hours as needed for Pain., Disp: , Rfl:     Review of patient's allergies indicates:   Allergen Reactions    Morphine Shortness Of Breath     Other reaction(s): Asthma       Review of Systems       Objective:     Vitals:    09/12/18 1418   BP:  "118/78   Weight: 85 kg (187 lb 6.3 oz)   Height: 5' 5" (1.651 m)     Body mass index is 31.18 kg/m².         Assessment/ Plan:     Encounter for screening mammogram for breast cancer  -     Mammo Digital Screening Bilat with Tomosynthesis CAD; Future; Expected date: 09/12/2018    Encounter for gynecological examination    Visit for screening mammogram  -     Mammo Digital Screening Bilat with Tomosynthesis CAD; Future; Expected date: 09/12/2018        Patient left before being seen  "

## 2018-09-18 ENCOUNTER — OFFICE VISIT (OUTPATIENT)
Dept: OBSTETRICS AND GYNECOLOGY | Facility: CLINIC | Age: 53
End: 2018-09-18
Payer: COMMERCIAL

## 2018-09-18 ENCOUNTER — APPOINTMENT (OUTPATIENT)
Dept: RADIOLOGY | Facility: OTHER | Age: 53
End: 2018-09-18
Attending: OBSTETRICS & GYNECOLOGY
Payer: COMMERCIAL

## 2018-09-18 VITALS — WEIGHT: 187 LBS | BODY MASS INDEX: 31.16 KG/M2 | HEIGHT: 65 IN

## 2018-09-18 VITALS — WEIGHT: 188.5 LBS | SYSTOLIC BLOOD PRESSURE: 110 MMHG | BODY MASS INDEX: 31.37 KG/M2 | DIASTOLIC BLOOD PRESSURE: 70 MMHG

## 2018-09-18 DIAGNOSIS — Z12.39 BREAST CANCER SCREENING: Primary | ICD-10-CM

## 2018-09-18 DIAGNOSIS — Z12.31 VISIT FOR SCREENING MAMMOGRAM: ICD-10-CM

## 2018-09-18 DIAGNOSIS — Z12.31 ENCOUNTER FOR SCREENING MAMMOGRAM FOR BREAST CANCER: ICD-10-CM

## 2018-09-18 PROCEDURE — 99999 PR PBB SHADOW E&M-EST. PATIENT-LVL III: CPT | Mod: PBBFAC,,, | Performed by: NURSE PRACTITIONER

## 2018-09-18 PROCEDURE — 77063 BREAST TOMOSYNTHESIS BI: CPT | Mod: 26,,, | Performed by: RADIOLOGY

## 2018-09-18 PROCEDURE — 99396 PREV VISIT EST AGE 40-64: CPT | Mod: S$GLB,,, | Performed by: NURSE PRACTITIONER

## 2018-09-18 PROCEDURE — 77067 SCR MAMMO BI INCL CAD: CPT | Mod: TC,PN

## 2018-09-18 PROCEDURE — 77067 SCR MAMMO BI INCL CAD: CPT | Mod: 26,,, | Performed by: RADIOLOGY

## 2018-09-18 NOTE — PROGRESS NOTES
Chief Complaint: Well Woman Exam    (Dr. Mcnamara patient)    Last Pap:  2017 Normal,  HPV negative    Last Mammo:  Today (18) in-office  Last DEXA:  n/a  Last Colonoscopy:   (nml - was told to follow-up in 10 years - per Dr. Jacobo on [facility])       Patient ID: Lola Mann is a 53 y.o. female.    Chief Complaint:  Annual Exam (Dr Mcnamara last pap  neg hpv neg )      History of Present Illness.  Lola Mann is a 53 y.o. female.  She has no breast or urinary symptoms.  She has no postcoital bleeding, pelvic pain or vaginal discharge.  Ms. Mann is currently sexually active with a single male partner.  She declines STD screening today.  Denies hot flashes, but has had an occasional hot flash at night, but typically occurs when menses are about to start.  Denies mood swings or vaginal dryness.    GYN & OB History  Patient's last menstrual period was 2018 (exact date).  Still having cycles, but irregular.    OB History    Para Term  AB Living   2 2 2 0 0 2   SAB TAB Ectopic Multiple Live Births   0 0 0   2      # Outcome Date GA Lbr Taj/2nd Weight Sex Delivery Anes PTL Lv   2 Term 12/15/04 39w0d  4.139 kg (9 lb 2 oz) M CS-LTranv Spinal  EARLENE   1 Term 97 43w0d  4.196 kg (9 lb 4 oz) M Vag-Spont Spinal  EARLENE      Obstetric Comments   Menarche ~ 16       Past Medical History:   Diagnosis Date    Abnormal Pap smear of cervix 2014    Hpv +     Acid reflux     DUB (dysfunctional uterine bleeding)     History of endometriosis     History of HPV infection 2014    Pap Hpv +    Neck and shoulder pain     Rheumatoid arthritis     Seasonal allergies     Thyroid disease     Hypo    Venous insufficiency of leg      Past Surgical History:   Procedure Laterality Date    APPENDECTOMY  1985     SECTION  , 2004    x 2     CHOLECYSTECTOMY  2009    COLONOSCOPY  2017    Normal  w/ Dr Moore  (Rtn 10 yrs)    DILATION AND CURETTAGE OF UTERUS   08/15/2013    DUB    ENDOMETRIAL ABLATION N/A 08/15/2013    [facility] @ [facility] -- Dr Mcnamara    ENDOMETRIAL BIOPSY      LASER ABLATION  12/26/2017    Endovenous Laser Ablation-Leg    PELVIC LAPAROSCOPY  1994 1994 and 1996  Dr Ferro    TUBAL LIGATION  2004    VARICOSE VEIN SURGERY       Family History   Problem Relation Age of Onset    Hypertension Maternal Grandmother     Heart disease Maternal Grandmother     Hypertension Sister     Hypertension Brother     Heart disease Maternal Grandfather 42        death due to MI    Cancer Paternal Grandmother     Cancer Paternal Grandfather         liver cancer    Stroke Brother     Seizures Mother     Hypertension Mother     Thyroid disease Mother     Alzheimer's disease Mother     Cancer Father         melanoma    Heart disease Father     Hyperlipidemia Father     Melanoma Father     Lung cancer Father     Breast cancer Other     Colon cancer Neg Hx     Ovarian cancer Neg Hx      Social History     Tobacco Use    Smoking status: Never Smoker    Smokeless tobacco: Never Used   Substance Use Topics    Alcohol use: Yes     Alcohol/week: 0.6 oz     Types: 1 Glasses of wine per week     Frequency: 2-4 times a month     Drinks per session: 3 or 4     Binge frequency: Never     Comment: social    Drug use: No       Current Outpatient Medications:     ARMOUR THYROID 120 mg Tab, Take 1 tablet by mouth once daily., Disp: , Rfl: 3    azelastine (ASTELIN) 137 mcg (0.1 %) nasal spray, 1 spray (137 mcg total) by Nasal route 2 (two) times daily., Disp: 30 mL, Rfl: 6    cefdinir (OMNICEF) 300 MG capsule, Take 1 capsule (300 mg total) by mouth 2 (two) times daily. for 10 days, Disp: 20 capsule, Rfl: 0    cyclobenzaprine (FLEXERIL) 5 MG tablet, 1-2 tabs po TID prn pain, Disp: 30 tablet, Rfl: 2    fexofenadine-pseudoephedrine (ALLEGRA-D 24) 180-240 mg per 24 hr tablet, Take 1 tablet by mouth as needed. , Disp: , Rfl:     hydroxychloroquine (PLAQUENIL)  200 mg tablet, Take 200 mg by mouth once daily. , Disp: , Rfl:     ibuprofen (ADVIL,MOTRIN) 200 MG tablet, Take 200 mg by mouth every 6 (six) hours as needed for Pain., Disp: , Rfl:     Review of patient's allergies indicates:   Allergen Reactions    Morphine Shortness Of Breath     Other reaction(s): Asthma       Review of Systems  GENERAL: Denies unintentional weight gain or weight loss. Feeling well overall.   SKIN: Denies rash or lesions.   HEENT: Denies headaches, or vision changes.   CARDIOVASCULAR: Denies palpitations or chest pain.   RESPIRATORY: Denies shortness of breath or dyspnea on exertion.  BREASTS: Denies pain, lumps, or nipple discharge.   ABDOMEN: Denies abdominal pain, constipation, diarrhea, nausea, vomiting, change in appetite.  URINARY: Denies frequency, dysuria, hematuria.  NEUROLOGIC: Denies syncope or weakness.   PSYCHIATRIC: Denies depression, anxiety or mood swings.  VULVAR: No pain, no lesions and no itching.  VAGINAL: No relaxation, no itching, no abnormal bleeding and no lesions.    Objective:     PHYSICAL EXAM:  /70   Wt 85.5 kg (188 lb 7.9 oz)   LMP 09/05/2018 (Exact Date)   BMI 31.37 kg/m²   Body mass index is 31.37 kg/m².     APPEARANCE: Well nourished, well developed, in no acute distress.  PSYCH: Appropriate mood and affect.  SKIN: No acne or hirsutism.  NECK: Neck symmetric without masses or thyromegaly  NODES: No inguinal, axillary, or supraclavicular lymph node enlargement  CHEST: Normal respiratory effort.  ABDOMEN: Soft.  No tenderness or masses.    BREASTS: Symmetrical, no skin changes or visible lesions.  No palpable masses or nipple discharge bilaterally.  PELVIC: External genitalia: normal external genitalia, no erythema, no discharge  and urethra: normal appearing urethra with no masses, tenderness or lesions.  Normal hair distribution.  Vagina normal appearing vagina with normal color and discharge, no lesions.  Cervix normal appearing cervix without  discharge or lesions, cervical motion tenderness absent.  No significant cystocele or rectocele.  Bimanual exam shows uterus to be uterus is normal size, shape, consistency and nontender, mobile.  Adnexa normal adnexa and no mass, fullness, tenderness.    EXTREMITIES: No edema.        Assessment/ Plan:     Breast cancer screening  -     Mammo Digital Screening Bilat with Tomosynthesis CAD; Future      Patient was counseled today on current ASCCP pap guidelines, the recommendation for yearly pelvic exams, healthy diet and exercise routines, annual mammograms and breast self awareness, and colonoscopy screening. She is to see her PCP for other health maintenance, including yearly influenza vaccination.    Recommend calcium 1200 mg and vitamin D 600 units daily and routine bone mineral density testing every 2 years.    Use of the [employer] Patient Portal discussed and encouraged during today's visit.     Follow-up in about 1 year (around 9/18/2019) for Annual.

## 2019-01-15 PROBLEM — R10.32 LLQ PAIN: Status: RESOLVED | Noted: 2017-04-25 | Resolved: 2019-01-15

## 2019-07-01 ENCOUNTER — TELEPHONE (OUTPATIENT)
Dept: OBSTETRICS AND GYNECOLOGY | Facility: CLINIC | Age: 54
End: 2019-07-01

## 2019-07-01 NOTE — TELEPHONE ENCOUNTER
Pt has a moveable tender mass in axilla. Advised she probably needs to see PCP or derm.  Scheduled 7/10 with Dr. Foster.  Declined sooner appt.

## 2019-07-01 NOTE — TELEPHONE ENCOUNTER
Dr. Mcnamara pt, states since last week she has been feeling a knot under her left armpit, sometimes it's painful sometimes its not, just feels uncomfortable. Doesn't know if she should come in for an appt to see her OBGYN or PCP to have it looked at. Please call pt and advise, thank you.

## 2019-07-19 ENCOUNTER — OFFICE VISIT (OUTPATIENT)
Dept: OBSTETRICS AND GYNECOLOGY | Facility: CLINIC | Age: 54
End: 2019-07-19
Attending: STUDENT IN AN ORGANIZED HEALTH CARE EDUCATION/TRAINING PROGRAM
Payer: COMMERCIAL

## 2019-07-19 VITALS — BODY MASS INDEX: 31.62 KG/M2 | HEIGHT: 64 IN | WEIGHT: 185.19 LBS

## 2019-07-19 DIAGNOSIS — N64.4 BREAST PAIN, LEFT: Primary | ICD-10-CM

## 2019-07-19 PROCEDURE — 99213 PR OFFICE/OUTPT VISIT, EST, LEVL III, 20-29 MIN: ICD-10-PCS | Mod: S$GLB,,, | Performed by: STUDENT IN AN ORGANIZED HEALTH CARE EDUCATION/TRAINING PROGRAM

## 2019-07-19 PROCEDURE — 99999 PR PBB SHADOW E&M-EST. PATIENT-LVL III: ICD-10-PCS | Mod: PBBFAC,,, | Performed by: STUDENT IN AN ORGANIZED HEALTH CARE EDUCATION/TRAINING PROGRAM

## 2019-07-19 PROCEDURE — 99999 PR PBB SHADOW E&M-EST. PATIENT-LVL III: CPT | Mod: PBBFAC,,, | Performed by: STUDENT IN AN ORGANIZED HEALTH CARE EDUCATION/TRAINING PROGRAM

## 2019-07-19 PROCEDURE — 3008F PR BODY MASS INDEX (BMI) DOCUMENTED: ICD-10-PCS | Mod: CPTII,S$GLB,, | Performed by: STUDENT IN AN ORGANIZED HEALTH CARE EDUCATION/TRAINING PROGRAM

## 2019-07-19 PROCEDURE — 3008F BODY MASS INDEX DOCD: CPT | Mod: CPTII,S$GLB,, | Performed by: STUDENT IN AN ORGANIZED HEALTH CARE EDUCATION/TRAINING PROGRAM

## 2019-07-19 PROCEDURE — 99213 OFFICE O/P EST LOW 20 MIN: CPT | Mod: S$GLB,,, | Performed by: STUDENT IN AN ORGANIZED HEALTH CARE EDUCATION/TRAINING PROGRAM

## 2019-07-19 NOTE — PROGRESS NOTES
Chief Complaint: Axillary lump     HPI:      Lola Mann is a 53 y.o.  who presents today for evaluation of axillary lump.  Reports she noticed it a few weeks ago.  Has been doing some physical therapy.  Reports she notices it intermittently.  It is tender.  No nipple discharge or skin changes.  No other issues or concerns.    OB History        2    Para   2    Term   2       0    AB   0    Living   2       SAB   0    TAB   0    Ectopic   0    Multiple        Live Births   2           Obstetric Comments   Menarche ~ 16           Past Medical History:   Diagnosis Date    Abnormal Pap smear of cervix 2014    Hpv +     Acid reflux     DUB (dysfunctional uterine bleeding)     History of endometriosis     History of HPV infection 2014    Pap Hpv +    Neck and shoulder pain     Rheumatoid arthritis     Right Olecranon Bursitis     Seasonal allergies     Thyroid disease     Hypo    Venous insufficiency of leg      Past Surgical History:   Procedure Laterality Date    APPENDECTOMY  1985     SECTION  , 2004    x 2     CHOLECYSTECTOMY  2009    COLONOSCOPY  2017    Normal  w/ Dr Moore  (Rtn 10 yrs)    DILATION AND CURETTAGE OF UTERUS  08/15/2013    DUB    ENDOMETRIAL ABLATION N/A 08/15/2013    [facility] @ [facility] -- Dr Mcnamara    ENDOMETRIAL BIOPSY      LASER ABLATION  2017    Endovenous Laser Ablation-Leg    PELVIC LAPAROSCOPY  1994 and   Dr Ferro    TUBAL LIGATION  2004    VARICOSE VEIN SURGERY       Social History     Socioeconomic History    Marital status:      Spouse name: Not on file    Number of children: 2    Years of education: Not on file    Highest education level: Not on file   Occupational History    Occupation:    Social Needs    Financial resource strain: Not on file    Food insecurity:     Worry: Not on file     Inability: Not on file    Transportation needs:     Medical: Not on file      Non-medical: Not on file   Tobacco Use    Smoking status: Never Smoker    Smokeless tobacco: Never Used   Substance and Sexual Activity    Alcohol use: Yes     Alcohol/week: 0.6 oz     Types: 1 Glasses of wine per week     Frequency: 2-4 times a month     Drinks per session: 3 or 4     Binge frequency: Never     Comment: social    Drug use: No    Sexual activity: Yes     Partners: Male     Birth control/protection: Surgical     Comment: Engaged:  Darius        (BTL 2004)   Lifestyle    Physical activity:     Days per week: Not on file     Minutes per session: Not on file    Stress: Not on file   Relationships    Social connections:     Talks on phone: Not on file     Gets together: Not on file     Attends Religion service: Not on file     Active member of club or organization: Not on file     Attends meetings of clubs or organizations: Not on file     Relationship status: Not on file   Other Topics Concern    Not on file   Social History Narrative    ** Merged History Encounter **          Family History   Problem Relation Age of Onset    Hypertension Maternal Grandmother     Heart disease Maternal Grandmother     Hypertension Sister     Hypertension Brother     Heart disease Maternal Grandfather 42        death due to MI    Cancer Paternal Grandmother     Cancer Paternal Grandfather         liver cancer    Stroke Brother     Seizures Mother     Hypertension Mother     Thyroid disease Mother     Alzheimer's disease Mother     Cancer Father         melanoma    Heart disease Father     Hyperlipidemia Father     Melanoma Father     Lung cancer Father     Breast cancer Other     Colon cancer Neg Hx     Ovarian cancer Neg Hx        Current Outpatient Medications:     ARMOUR THYROID 120 mg Tab, Take 1 tablet by mouth once daily., Disp: , Rfl: 3    fexofenadine-pseudoephedrine (ALLEGRA-D 24) 180-240 mg per 24 hr tablet, Take 1 tablet by mouth as needed. , Disp: , Rfl:      "hydroxychloroquine (PLAQUENIL) 200 mg tablet, Take 200 mg by mouth once daily. , Disp: , Rfl:     ibuprofen (ADVIL,MOTRIN) 800 MG tablet, Take 1 tablet (800 mg total) by mouth every 8 (eight) hours as needed for Pain., Disp: 60 tablet, Rfl: 2    ROS:     GENERAL: Denies unintentional weight gain or weight loss. Feeling well overall.   SKIN: Denies rash or lesions.   HEENT: Denies headaches, or vision changes.   CARDIOVASCULAR: Denies palpitations or chest pain.   RESPIRATORY: Denies shortness of breath or dyspnea on exertion.  BREASTS: Denies pain, lumps, or nipple discharge.   ABDOMEN: Denies abdominal pain, constipation, diarrhea, nausea, vomiting, change in appetite.  URINARY: Denies frequency, dysuria, hematuria.  NEUROLOGIC: Denies syncope or weakness.   PSYCHIATRIC: Denies depression, anxiety or mood swings.    Physical Exam:      PHYSICAL EXAM:  Ht 5' 4" (1.626 m)   Wt 84 kg (185 lb 3 oz)   BMI 31.79 kg/m²   Body mass index is 31.79 kg/m².     APPEARANCE: Well nourished, well developed, in no acute distress.  PSYCH: Appropriate mood and affect.  SKIN: No acne or hirsutism.  NECK: Neck symmetric without masses or thyromegaly.  NODES: No inguinal, axillary, or supraclavicular lymph node enlargement.  BREASTS: Symmetrical, no skin changes or visible lesions.  TTP at L adnexa, no discrete lump appreciated today.  No palpable masses or nipple discharge bilaterally.  EXTREMITIES: No edema.  No tenderness to palpation.    Assessment/Plan:     53 y.o.     Breast pain, left  -     US Breast Left Complete; Future; Expected date: 2019  -     Mammo Digital Diagnostic Bilat w/ Josias; Future; Expected date: 2019          Counselin.  Left breast pain:  No discrete lesion on exam today.  Discussed possible musculoskeletal etiology.  Also discussed imaging with mammogram and U/S.  Ordered and will follow up results.  Se will call if symptoms worsen or do not improve.  2.  Follow up with PCP for " other health maintenance.  3.  RTC for annual or sooner if needed.    Use of the [employer] Patient Portal discussed and encouraged during today's visit.

## 2019-07-22 ENCOUNTER — HOSPITAL ENCOUNTER (OUTPATIENT)
Dept: RADIOLOGY | Facility: HOSPITAL | Age: 54
Discharge: HOME | End: 2019-07-22
Attending: STUDENT IN AN ORGANIZED HEALTH CARE EDUCATION/TRAINING PROGRAM
Payer: COMMERCIAL

## 2019-07-22 VITALS — BODY MASS INDEX: 31.58 KG/M2 | HEIGHT: 64 IN | WEIGHT: 185 LBS

## 2019-07-22 DIAGNOSIS — N64.4 BREAST PAIN, LEFT: ICD-10-CM

## 2019-07-22 PROCEDURE — 77066 DX MAMMO INCL CAD BI: CPT | Mod: 26,,, | Performed by: RADIOLOGY

## 2019-07-22 PROCEDURE — 77066 MAMMO DIGITAL DIAGNOSTIC BILAT WITH TOMOSYNTHESIS_CAD: ICD-10-PCS | Mod: 26,,, | Performed by: RADIOLOGY

## 2019-07-22 PROCEDURE — 77062 MAMMO DIGITAL DIAGNOSTIC BILAT WITH TOMOSYNTHESIS_CAD: ICD-10-PCS | Mod: 26,,, | Performed by: RADIOLOGY

## 2019-07-22 PROCEDURE — 76642 US BREAST LEFT LIMITED: ICD-10-PCS | Mod: 26,LT,, | Performed by: RADIOLOGY

## 2019-07-22 PROCEDURE — 77062 BREAST TOMOSYNTHESIS BI: CPT | Mod: 26,,, | Performed by: RADIOLOGY

## 2019-07-22 PROCEDURE — 76642 ULTRASOUND BREAST LIMITED: CPT | Mod: 26,LT,, | Performed by: RADIOLOGY

## 2019-07-22 PROCEDURE — 76642 ULTRASOUND BREAST LIMITED: CPT | Mod: TC,PO,LT

## 2019-07-22 PROCEDURE — 77062 BREAST TOMOSYNTHESIS BI: CPT | Mod: TC,PO

## 2019-10-01 ENCOUNTER — OFFICE VISIT (OUTPATIENT)
Dept: OBSTETRICS AND GYNECOLOGY | Facility: CLINIC | Age: 54
End: 2019-10-01
Payer: COMMERCIAL

## 2019-10-01 VITALS
DIASTOLIC BLOOD PRESSURE: 68 MMHG | SYSTOLIC BLOOD PRESSURE: 98 MMHG | BODY MASS INDEX: 31.18 KG/M2 | HEIGHT: 64 IN | WEIGHT: 182.63 LBS

## 2019-10-01 DIAGNOSIS — R23.2 HOT FLASHES: ICD-10-CM

## 2019-10-01 DIAGNOSIS — N89.8 VAGINAL DRYNESS: ICD-10-CM

## 2019-10-01 DIAGNOSIS — N95.1 PERIMENOPAUSAL SYMPTOMS: ICD-10-CM

## 2019-10-01 DIAGNOSIS — Z01.419 ENCOUNTER FOR GYNECOLOGICAL EXAMINATION: Primary | ICD-10-CM

## 2019-10-01 DIAGNOSIS — R45.86 EMOTIONAL LABILITY: ICD-10-CM

## 2019-10-01 DIAGNOSIS — R63.5 UNEXPLAINED WEIGHT GAIN: ICD-10-CM

## 2019-10-01 DIAGNOSIS — N92.6 IRREGULAR MENSES: ICD-10-CM

## 2019-10-01 PROCEDURE — 99999 PR PBB SHADOW E&M-EST. PATIENT-LVL III: CPT | Mod: PBBFAC,,, | Performed by: NURSE PRACTITIONER

## 2019-10-01 PROCEDURE — 99396 PREV VISIT EST AGE 40-64: CPT | Mod: S$GLB,,, | Performed by: NURSE PRACTITIONER

## 2019-10-01 PROCEDURE — 99999 PR PBB SHADOW E&M-EST. PATIENT-LVL III: ICD-10-PCS | Mod: PBBFAC,,, | Performed by: NURSE PRACTITIONER

## 2019-10-01 PROCEDURE — 99396 PR PREVENTIVE VISIT,EST,40-64: ICD-10-PCS | Mod: S$GLB,,, | Performed by: NURSE PRACTITIONER

## 2019-10-01 RX ORDER — IVERMECTIN 10 MG/G
CREAM TOPICAL
COMMUNITY
Start: 2019-09-11 | End: 2020-12-11

## 2019-10-01 RX ORDER — THYROID 30 MG/1
30 TABLET ORAL
COMMUNITY
End: 2020-01-30

## 2019-10-01 NOTE — PROGRESS NOTES
Chief Complaint: Well Woman Exam     (Dr. Mcnamara patient)    Last Pap:  2017 Normal,  HPV negative    Last Mammo:  19 (diagnostic - nml)    Last DEXA: n/a    Colonoscopy:  At age 52 - nml (told to return in 10 yrs per )    HPI:      Lola Mann is a 54 y.o.  who presents today for well woman exam.   She does c/o hot flashes, menses starting to become irregular in both timing and duration, mood swings (very emotional at times), unexplained weight gain, and vaginal dryness.  She would like to know if  could order labs for her based upon her perimenopausal symptoms, and then see her to discuss labs, or if she'd need to see her for consult and have labs ordered at that time.  Advised pt I would send this note to  to determine how she'd like to proceed.  Denies dyspareunia.  Patient denies abnormal vaginal discharge, pelvic pain, urinary problems, or changes in appetite.     LMP: Patient's last menstrual period was 2019.      Ms. Mann is currently sexually active with a single male partner.  She declines STD screening today.    OB History    Para Term  AB Living   2 2 2 0 0 2   SAB TAB Ectopic Multiple Live Births   0 0 0   2      # Outcome Date GA Lbr Taj/2nd Weight Sex Delivery Anes PTL Lv   2 Term 12/15/04 39w0d  4.139 kg (9 lb 2 oz) M CS-LTranv Spinal  EARLENE   1 Term 97 43w0d  4.196 kg (9 lb 4 oz) M Vag-Spont Spinal  EARLENE      Obstetric Comments   Menarche ~ 16     Past Medical History:   Diagnosis Date    Abnormal Pap smear of cervix 2014    Hpv +     Acid reflux     DUB (dysfunctional uterine bleeding)     History of endometriosis     History of HPV infection 2014    Pap Hpv +    Neck and shoulder pain     Rheumatoid arthritis     Right Olecranon Bursitis     Seasonal allergies     Thyroid disease     Hypo    Venous insufficiency of leg      Past Surgical History:   Procedure Laterality Date    APPENDECTOMY       " SECTION  , 2004    x 2     CHOLECYSTECTOMY  2009    COLONOSCOPY  2017    Normal  w/ Dr Moore  (Rtn 10 yrs)    DILATION AND CURETTAGE OF UTERUS  08/15/2013    DUB    ENDOMETRIAL ABLATION N/A 08/15/2013    [facility] @ [facility] -- Dr Mcnamara    ENDOMETRIAL BIOPSY      LASER ABLATION  2017    Endovenous Laser Ablation-Leg    PELVIC LAPAROSCOPY  1994 and   Dr Ferro    TUBAL LIGATION      VARICOSE VEIN SURGERY           Current Outpatient Medications:     ARMOUR THYROID 120 mg Tab, Take 1 tablet by mouth once daily., Disp: , Rfl: 3    fexofenadine-pseudoephedrine (ALLEGRA-D 24) 180-240 mg per 24 hr tablet, Take 1 tablet by mouth as needed. , Disp: , Rfl:     hydroxychloroquine (PLAQUENIL) 200 mg tablet, Take 200 mg by mouth once daily. , Disp: , Rfl:     ibuprofen (ADVIL,MOTRIN) 800 MG tablet, Take 1 tablet (800 mg total) by mouth every 8 (eight) hours as needed for Pain., Disp: 60 tablet, Rfl: 2    SOOLANTRA 1 % Crea, , Disp: , Rfl:     thyroid, pork, (ARMOUR THYROID) 30 mg Tab, Take 30 mg by mouth before breakfast., Disp: , Rfl:       ROS:     GENERAL: REPORTS unintentional weight gain. Feeling well overall.   SKIN: Denies rash or lesions.   HEENT: Denies headaches, or vision changes.   CARDIOVASCULAR: Denies palpitations or chest pain.   RESPIRATORY: Denies shortness of breath or dyspnea on exertion.  BREASTS: Denies pain, lumps, or nipple discharge.   ABDOMEN: Denies abdominal pain, constipation, diarrhea, nausea, vomiting, change in appetite, or rectal bleeding.  URINARY: Denies frequency, urgency, dysuria, hematuria.  REPRODUCTIVE:  See HPI.  NEUROLOGIC: Denies syncope or weakness.   PSYCHIATRIC: Denies depression or anxiety;  REPORTS labile mood swings.    Physical Exam:     PHYSICAL EXAM:  BP 98/68   Ht 5' 4" (1.626 m)   Wt 82.9 kg (182 lb 10.4 oz)   LMP 2019   BMI 31.35 kg/m²   Body mass index is 31.35 kg/m².     APPEARANCE: Well nourished, well " developed, in no acute distress.  PSYCH: Appropriate mood and affect.  SKIN: No acne or hirsutism.  NECK:  Neck symmetric without masses or thyromegaly  NODES:  No inguinal, axillary, or supraclavicular lymph node enlargement  CHEST:  Normal respiratory effort.  ABDOMEN:  Soft.  No tenderness or masses.  No distention. No hernias palpated. No CVA tenderness.  BREASTS:  Symmetrical, no skin changes or visible lesions.  No palpable masses or nipple discharge bilaterally.  VULVA:  No lesions. Normal external female genitalia.  URETHRAL MEATUS:  Normal size and location, no lesions, no prolapse.  URETHRA:  No masses, tenderness, or prolapse.  VAGINA:  Moist. No lesions or lacerations noted. No abnormal discharge present. No odor present.   CERVIX:  No lesions or discharge. No cervical motion tenderness.   UTERUS:  Normal size, regular shape, mobile, non-tender.  ADNEXA:  No tenderness. No fullness or masses palpated in the adnexal regions.   ANUS PERINEUM:  Normal.    Assessment/Plan:     Encounter for gynecological examination    Hot flashes    Perimenopausal symptoms    Irregular menses    Emotional lability    Unexplained weight gain    Vaginal dryness      Counseling:     Patient was counseled today on the new ACS guidelines for cervical cytology screening as well as the current recommendations for breast cancer screening.  Patient was also counseled on the recommendation for yearly pelvic exams, healthy diet and exercise routines, and breast self awareness.  She is to see her PCP for other health maintenance.  Counseling session lasted approximately 10 minutes, and all her questions were answered.    *Recommend calcium 1200 mg and vitamin D 600 units daily and routine bone mineral  density testing every 2 years.    * Use of the [employer] Patient Portal discussed and encouraged during today's visit.     Follow-up:  in 1 year for routine well woman exam.

## 2019-10-07 ENCOUNTER — TELEPHONE (OUTPATIENT)
Dept: OBSTETRICS AND GYNECOLOGY | Facility: CLINIC | Age: 54
End: 2019-10-07

## 2019-10-07 DIAGNOSIS — Z13.21 ENCOUNTER FOR VITAMIN DEFICIENCY SCREENING: ICD-10-CM

## 2019-10-07 DIAGNOSIS — N95.1 PERIMENOPAUSAL: Primary | ICD-10-CM

## 2019-10-07 NOTE — TELEPHONE ENCOUNTER
Pt saw Kelsy last week and says she is still waiting to find out if Dr Mcnamara wants her to have lab work to see if she is in menopause.

## 2019-10-07 NOTE — TELEPHONE ENCOUNTER
Pt requesting labs to test her hormone levels to determine if she is menopausal. Can these labs be ordered by Dr. Mcnamara without an appointment? Does this patient need to see Dr. Mcnamara? Please advise. lana

## 2019-10-07 NOTE — TELEPHONE ENCOUNTER
MD Trice Orr MA   Caller: Unspecified (Today, 11:10 AM)             I placed orders and she can have them drawn.  She needs an appointment to discuss labs and plan      Informed pt of above information and scheduled appointment with her. Pt is requesting to go to [facility] orders re-entered.

## 2019-12-11 ENCOUNTER — PATIENT MESSAGE (OUTPATIENT)
Dept: OBSTETRICS AND GYNECOLOGY | Facility: CLINIC | Age: 54
End: 2019-12-11

## 2019-12-11 ENCOUNTER — TELEPHONE (OUTPATIENT)
Dept: OBSTETRICS AND GYNECOLOGY | Facility: CLINIC | Age: 54
End: 2019-12-11

## 2019-12-11 NOTE — TELEPHONE ENCOUNTER
Swing pt, had hormone consult scheduled for 12/11 and had to reschedule due to work, she is rescheduled for 1/30 and pt is asking if she's going to have to repeat labs? Please call pt and advise, thank you.

## 2020-01-30 ENCOUNTER — OFFICE VISIT (OUTPATIENT)
Dept: OBSTETRICS AND GYNECOLOGY | Facility: CLINIC | Age: 55
End: 2020-01-30
Payer: COMMERCIAL

## 2020-01-30 VITALS
BODY MASS INDEX: 30.92 KG/M2 | DIASTOLIC BLOOD PRESSURE: 88 MMHG | HEIGHT: 64 IN | WEIGHT: 181.13 LBS | SYSTOLIC BLOOD PRESSURE: 130 MMHG

## 2020-01-30 DIAGNOSIS — N95.1 PERIMENOPAUSAL: Primary | ICD-10-CM

## 2020-01-30 PROCEDURE — 3008F PR BODY MASS INDEX (BMI) DOCUMENTED: ICD-10-PCS | Mod: CPTII,S$GLB,, | Performed by: OBSTETRICS & GYNECOLOGY

## 2020-01-30 PROCEDURE — 99999 PR PBB SHADOW E&M-EST. PATIENT-LVL II: ICD-10-PCS | Mod: PBBFAC,,, | Performed by: OBSTETRICS & GYNECOLOGY

## 2020-01-30 PROCEDURE — 99214 OFFICE O/P EST MOD 30 MIN: CPT | Mod: S$GLB,,, | Performed by: OBSTETRICS & GYNECOLOGY

## 2020-01-30 PROCEDURE — 3008F BODY MASS INDEX DOCD: CPT | Mod: CPTII,S$GLB,, | Performed by: OBSTETRICS & GYNECOLOGY

## 2020-01-30 PROCEDURE — 99214 PR OFFICE/OUTPT VISIT, EST, LEVL IV, 30-39 MIN: ICD-10-PCS | Mod: S$GLB,,, | Performed by: OBSTETRICS & GYNECOLOGY

## 2020-01-30 PROCEDURE — 99999 PR PBB SHADOW E&M-EST. PATIENT-LVL II: CPT | Mod: PBBFAC,,, | Performed by: OBSTETRICS & GYNECOLOGY

## 2020-01-30 RX ORDER — PREDNISONE 20 MG/1
TABLET ORAL
COMMUNITY
Start: 2019-12-20 | End: 2020-07-23 | Stop reason: SDUPTHER

## 2020-01-30 RX ORDER — PREDNISONE 10 MG/1
TABLET ORAL
COMMUNITY
Start: 2020-01-29 | End: 2020-07-23 | Stop reason: SDUPTHER

## 2020-01-30 RX ORDER — DOXYCYCLINE 100 MG/1
CAPSULE ORAL
COMMUNITY
Start: 2020-01-29 | End: 2020-08-01

## 2020-01-30 NOTE — PROGRESS NOTES
Subjective:      Lola Mann is a 54 y.o. female who presents to discuss hormone replacement therapy.  Menarche occurred at age 17 and the patient's LMP was 1/24/2020. The longest time she went without a period was 6 months.  Patient is requesting hormone replacement therapy due to hot flashes, insomnia, moodiness, no energy, vaginal dryness, PITER, anxiety, memory loss, night sweats, brain fog, hair loss, joint pain, trouble concentrating, and difficulty focusing.   She is on Trevett thyroid for hypothyroidism and he just lowered dose to 120 mg.  She takes Allegra-D also which could affect dryness.   The patient is sexually active.  She denies the following contraindications to HRT:  Vaginal bleeding, history of VTE/PE, thrombophilia,  breast cancer, or active liver disease.       PCP: Dr. Alvarez    Routine labs: 1/15/19 - see below  Pap smear: 4/11/2017 Normal HPV negative  Mammogram: 7/22/19 Normal    No visits with results within 3 Month(s) from this visit.   Latest known visit with results is:   Office Visit on 01/15/2019   Component Date Value Ref Range Status    Cholesterol 05/13/2019 168  100 - 199 mg/dL Final    Triglycerides 05/13/2019 232* 0 - 149 mg/dL Final    HDL 05/13/2019 48  >39 mg/dL Final    VLDL Cholesterol Tomy 05/13/2019 46* 5 - 40 mg/dL Final    LDL Calculated 05/13/2019 74  0 - 99 mg/dL Final    Glucose 05/13/2019 90  65 - 99 mg/dL Final    BUN, Bld 05/13/2019 8  6 - 24 mg/dL Final    Creatinine 05/13/2019 0.80  0.57 - 1.00 mg/dL Final    eGFR if non African American 05/13/2019 86  >59 mL/min/1.73 Final    eGFR if African American 05/13/2019 99  >59 mL/min/1.73 Final    BUN/Creatinine Ratio 05/13/2019 10  9 - 23 Final    Sodium 05/13/2019 140  134 - 144 mmol/L Final    Potassium 05/13/2019 4.5  3.5 - 5.2 mmol/L Final    Chloride 05/13/2019 107* 96 - 106 mmol/L Final    CO2 05/13/2019 21  20 - 29 mmol/L Final    Calcium 05/13/2019 9.0  8.7 - 10.2 mg/dL Final    Total Protein  05/13/2019 5.7* 6.0 - 8.5 g/dL Final    Albumin 05/13/2019 4.2  3.5 - 5.5 g/dL Final    Globulin, Total 05/13/2019 1.5  1.5 - 4.5 g/dL Final    Albumin/Globulin Ratio 05/13/2019 2.8* 1.2 - 2.2 Final    Total Bilirubin 05/13/2019 0.2  0.0 - 1.2 mg/dL Final    Alkaline Phosphatase 05/13/2019 53  39 - 117 IU/L Final    AST 05/13/2019 12  0 - 40 IU/L Final    ALT 05/13/2019 10  0 - 32 IU/L Final    TSH 05/13/2019 3.150  0.450 - 4.500 uIU/mL Final    WBC 05/13/2019 5.5  3.4 - 10.8 x10E3/uL Final    RBC 05/13/2019 4.52  3.77 - 5.28 x10E6/uL Final    Hemoglobin 05/13/2019 14.5  11.1 - 15.9 g/dL Final    Hematocrit 05/13/2019 43.8  34.0 - 46.6 % Final    Mean Corpuscular Volume 05/13/2019 97  79 - 97 fL Final    Mean Corpuscular Hemoglobin 05/13/2019 32.1  26.6 - 33.0 pg Final    Mean Corpuscular Hemoglobin Conc 05/13/2019 33.1  31.5 - 35.7 g/dL Final    RDW 05/13/2019 13.4  12.3 - 15.4 % Final    Platelets 05/13/2019 227  150 - 379 x10E3/uL Final    Neutrophils 05/13/2019 49  Not Estab. % Final    Lymph% 05/13/2019 40  Not Estab. % Final    Mono% 05/13/2019 7  Not Estab. % Final    Eosinophil% 05/13/2019 2  Not Estab. % Final    Basophil% 05/13/2019 1  Not Estab. % Final    Neutrophils Absolute 05/13/2019 2.8  1.4 - 7.0 x10E3/uL Final    Lymph # 05/13/2019 2.2  0.7 - 3.1 x10E3/uL Final    Mono # 05/13/2019 0.4  0.1 - 0.9 x10E3/uL Final    Eos # 05/13/2019 0.1  0.0 - 0.4 x10E3/uL Final    Baso # 05/13/2019 0.0  0.0 - 0.2 x10E3/uL Final    Immature Granulocytes 05/13/2019 1  Not Estab. % Final    Immature Grans (Abs) 05/13/2019 0.0  0.0 - 0.1 x10E3/uL Final       Past Medical History:   Diagnosis Date    Abnormal Pap smear of cervix 06/17/2014    Hpv +     Acid reflux     DUB (dysfunctional uterine bleeding)     History of endometriosis     History of HPV infection 06/17/2014    Pap Hpv +    Neck and shoulder pain     Rheumatoid arthritis     Right Olecranon Bursitis     Seasonal  allergies     Thyroid disease     Hypo    Venous insufficiency of leg      Past Surgical History:   Procedure Laterality Date    APPENDECTOMY  1985     SECTION  , 2004    x 2     CHOLECYSTECTOMY  2009    COLONOSCOPY  2017    Normal  w/ Dr Moore  (Rtn 10 yrs)    DILATION AND CURETTAGE OF UTERUS  08/15/2013    DUB    ENDOMETRIAL ABLATION N/A 08/15/2013    [facility] Dr Mcnamara    ENDOMETRIAL BIOPSY      LASER ABLATION  2017    Endovenous Laser Ablation-Leg    PELVIC LAPAROSCOPY  1994 and   Dr Ferro    TUBAL LIGATION  2004    VARICOSE VEIN SURGERY       Social History     Tobacco Use    Smoking status: Never Smoker    Smokeless tobacco: Never Used   Substance Use Topics    Alcohol use: Yes     Alcohol/week: 1.0 standard drinks     Types: 1 Glasses of wine per week     Frequency: 2-4 times a month     Drinks per session: 3 or 4     Binge frequency: Never     Comment: social    Drug use: No     Family History   Problem Relation Age of Onset    Hypertension Maternal Grandmother     Heart disease Maternal Grandmother     Hypertension Sister     Hypertension Brother     Heart disease Maternal Grandfather 42        death due to MI    Cancer Paternal Grandmother     Cancer Paternal Grandfather         liver cancer    Stroke Brother     Seizures Mother     Hypertension Mother     Thyroid disease Mother     Alzheimer's disease Mother     Cancer Father         melanoma    Heart disease Father     Hyperlipidemia Father     Melanoma Father     Lung cancer Father     Breast cancer Other     Colon cancer Neg Hx     Ovarian cancer Neg Hx      OB History    Para Term  AB Living   2 2 2 0 0 2   SAB TAB Ectopic Multiple Live Births   0 0 0   2      # Outcome Date GA Lbr Taj/2nd Weight Sex Delivery Anes PTL Lv   2 Term 12/15/04 39w0d  4.139 kg (9 lb 2 oz) M CS-LTranv Spinal  EARLENE   1 Term 97 43w0d  4.196 kg (9 lb 4 oz) M Vag-Spont Spinal  " EARLENE      Obstetric Comments   Menarche ~ 16       Current Outpatient Medications:     ARMOUR THYROID 120 mg Tab, Take 1 tablet by mouth once daily., Disp: , Rfl: 3    doxycycline (VIBRAMYCIN) 100 MG Cap, , Disp: , Rfl:     fexofenadine-pseudoephedrine (ALLEGRA-D 24) 180-240 mg per 24 hr tablet, Take 1 tablet by mouth as needed. , Disp: , Rfl:     hydroxychloroquine (PLAQUENIL) 200 mg tablet, Take 200 mg by mouth once daily. , Disp: , Rfl:     predniSONE (DELTASONE) 10 MG tablet, , Disp: , Rfl:     predniSONE (DELTASONE) 20 MG tablet, , Disp: , Rfl:     SOOLANTRA 1 % Crea, , Disp: , Rfl:     Review of Systems:  General: No fever, chills, or weight loss.  Chest: No chest pain, shortness of breath, or palpitations.  Breast: No pain, masses, or nipple discharge.  Vulva: No pain, lesions, or itching.  Vagina: No relaxation, itching, discharge, or lesions.  Abdomen: No pain, nausea, vomiting, diarrhea, or constipation.  Urinary: No incontinence, nocturia, frequency, or dysuria.  Extremities:  No leg cramps, edema, or calf pain.  Neurologic: No headaches, dizziness, or visual changes.    Vitals:    01/30/20 0925   BP: 130/88   Weight: 82.2 kg (181 lb 1.7 oz)   Height: 5' 4" (1.626 m)   PainSc: 0-No pain     Body mass index is 31.09 kg/m².    Assessment:    Perimenopausal        Plan:   Risks and benefits of hormone replacement therapy were discussed.  Hormone replacement therapy options, including bioidentical versus non-bioidentical hormones, as well as alternatives discussed.    Start:   Progesterone 100 mg orally QPM  Discussed:   Testosterone.  FDA warning for MI, stroke, and DVT reviewed.  Patient is aware this is off-label use.   Melatonin after optimized on P4   [place]  Follow up in 3 months  Will recheck labs once on typical optimal dose or if having side effects.  Instructed patient to call if she experiences any side effects or has any questions.  I spent 30 minutes with this patient today, >50% " counseling.

## 2020-07-23 ENCOUNTER — OFFICE VISIT (OUTPATIENT)
Dept: URGENT CARE | Facility: CLINIC | Age: 55
End: 2020-07-23
Payer: COMMERCIAL

## 2020-07-23 ENCOUNTER — TELEPHONE (OUTPATIENT)
Dept: URGENT CARE | Facility: CLINIC | Age: 55
End: 2020-07-23

## 2020-07-23 VITALS
WEIGHT: 181 LBS | RESPIRATION RATE: 16 BRPM | OXYGEN SATURATION: 96 % | DIASTOLIC BLOOD PRESSURE: 71 MMHG | HEART RATE: 101 BPM | HEIGHT: 64 IN | TEMPERATURE: 99 F | SYSTOLIC BLOOD PRESSURE: 113 MMHG | BODY MASS INDEX: 30.9 KG/M2

## 2020-07-23 DIAGNOSIS — R05.9 COUGH: ICD-10-CM

## 2020-07-23 DIAGNOSIS — R06.02 SOB (SHORTNESS OF BREATH): Primary | ICD-10-CM

## 2020-07-23 DIAGNOSIS — R52 GENERALIZED BODY ACHES: ICD-10-CM

## 2020-07-23 DIAGNOSIS — J18.9 PNEUMONIA DUE TO INFECTIOUS ORGANISM, UNSPECIFIED LATERALITY, UNSPECIFIED PART OF LUNG: ICD-10-CM

## 2020-07-23 PROCEDURE — 99214 OFFICE O/P EST MOD 30 MIN: CPT | Mod: S$GLB,,, | Performed by: NURSE PRACTITIONER

## 2020-07-23 PROCEDURE — 71046 XR CHEST PA AND LATERAL: ICD-10-PCS | Mod: S$GLB,,, | Performed by: RADIOLOGY

## 2020-07-23 PROCEDURE — 71046 X-RAY EXAM CHEST 2 VIEWS: CPT | Mod: S$GLB,,, | Performed by: RADIOLOGY

## 2020-07-23 PROCEDURE — 99214 PR OFFICE/OUTPT VISIT, EST, LEVL IV, 30-39 MIN: ICD-10-PCS | Mod: S$GLB,,, | Performed by: NURSE PRACTITIONER

## 2020-07-23 RX ORDER — DOXYCYCLINE 100 MG/1
100 CAPSULE ORAL 2 TIMES DAILY
Qty: 14 CAPSULE | Refills: 0 | Status: SHIPPED | OUTPATIENT
Start: 2020-07-23 | End: 2020-07-30

## 2020-07-23 RX ORDER — HYDROXYCHLOROQUINE SULFATE 200 MG/1
200 TABLET, FILM COATED ORAL DAILY
COMMUNITY

## 2020-07-23 RX ORDER — ERGOCALCIFEROL 1.25 MG/1
CAPSULE ORAL
COMMUNITY
Start: 2020-07-16 | End: 2022-05-02

## 2020-07-23 RX ORDER — AMOXICILLIN 500 MG/1
1000 CAPSULE ORAL 3 TIMES DAILY
Qty: 42 CAPSULE | Refills: 0 | Status: SHIPPED | OUTPATIENT
Start: 2020-07-23 | End: 2020-07-30

## 2020-07-23 NOTE — PATIENT INSTRUCTIONS
Follow up with your doctor in a few days.  Return to the urgent care or go to the ER if symptoms get worse.      Start albuterol every 6 hours as needed.  Doxy and amoxicillin antibiotics.   Deep breathing,   Hydrate, electrolytes (such as pedialyte) also... (vit D3, zinc, vit c, pepcid)  Continue antihistamine or flonase steroid spray.      Viral Upper Respiratory Illness (Adult)  You have a viral upper respiratory illness (URI), which is another term for the common cold. This illness is contagious during the first few days. It is spread through the air by coughing and sneezing. It may also be spread by direct contact (touching the sick person and then touching your own eyes, nose, or mouth). Frequent handwashing will decrease risk of spread. Most viral illnesses go away within 7 to 10 days with rest and simple home remedies. Sometimes the illness may last for several weeks. Antibiotics will not kill a virus, and they are generally not prescribed for this condition.    Home care  · If symptoms are severe, rest at home for the first 2 to 3 days. When you resume activity, don't let yourself get too tired.  · Avoid being exposed to cigarette smoke (yours or others).  · You may use acetaminophen or ibuprofen to control pain and fever, unless another medicine was prescribed. (Note: If you have chronic liver or kidney disease, have ever had a stomach ulcer or gastrointestinal bleeding, or are taking blood-thinning medicines, talk with your healthcare provider before using these medicines.) Aspirin should never be given to anyone under 18 years of age who is ill with a viral infection or fever. It may cause severe liver or brain damage.  · Your appetite may be poor, so a light diet is fine. Avoid dehydration by drinking 6 to 8 glasses of fluids per day (water, soft drinks, juices, tea, or soup). Extra fluids will help loosen secretions in the nose and lungs.  · Over-the-counter cold medicines will not shorten the length  of time youre sick, but they may be helpful for the following symptoms: cough, sore throat, and nasal and sinus congestion. (Note: Do not use decongestants if you have high blood pressure.)  Follow-up care  Follow up with your healthcare provider, or as advised.  When to seek medical advice  Call your healthcare provider right away if any of these occur:  · Cough with lots of colored sputum (mucus)  · Severe headache; face, neck, or ear pain  · Difficulty swallowing due to throat pain  · Fever of 100.4°F (38°C)  Call 911, or get immediate medical care  Call emergency services right away if any of these occur:  · Chest pain, shortness of breath, wheezing, or difficulty breathing  · Coughing up blood  · Inability to swallow due to throat pain  Date Last Reviewed: 9/13/2015  © 9217-6686 [employer]. [address] [place] [address], [place] [address]. All rights reserved. This information is not intended as a substitute for professional medical care. Always follow your healthcare professional's instructions.

## 2020-07-23 NOTE — PROGRESS NOTES
"Subjective:       Patient ID: Lola Mann is a 54 y.o. female.    Vitals:  height is 5' 4" (1.626 m) and weight is 82.1 kg (181 lb). Her tympanic temperature is 98.9 °F (37.2 °C). Her blood pressure is 113/71 and her pulse is 101. Her respiration is 16 and oxygen saturation is 96%.     Chief Complaint: Nasal Congestion and Shortness of Breath    Pt presents today with SOB, fever X's 9-10 days. Pt was tested for Covid on Tuesday, no results back yet. Pt did take course of Cefdnir 300 mg finished yesterday, steroid injection and oral steroid from allergist. Pt also takes Plaquenil 200 mg qd. For RA.  rx albuterol but has not started yet. reprots feeling feverish, hot sweats, chills, body aches. Reports exposure to friend whose  is positive for covid. Occupation as .     Shortness of Breath  This is a new problem. The current episode started 1 to 4 weeks ago. The problem occurs constantly. The problem has been unchanged. Associated symptoms include a fever. Pertinent negatives include no abdominal pain, chest pain, claudication, coryza, ear pain, headaches, hemoptysis, leg pain, leg swelling, neck pain, orthopnea, PND, rash, rhinorrhea, sore throat, sputum production, swollen glands, syncope, vomiting or wheezing. Nothing aggravates the symptoms. The patient has no known risk factors for DVT/PE. She has tried prescription cough suppressants and steroid inhalers for the symptoms. The treatment provided no relief. There is no history of allergies, aspirin allergies, asthma, bronchiolitis, CAD, chronic lung disease, COPD, DVT, a heart failure, PE, pneumonia or a recent surgery.       Constitution: Positive for fever. Negative for chills, sweating and fatigue.   HENT: Negative for ear pain, congestion, sinus pain, sinus pressure, sore throat and voice change.    Neck: Negative for neck pain and painful lymph nodes.   Cardiovascular: Negative for chest pain, leg swelling and passing out.   Eyes: " Negative for eye redness.   Respiratory: Positive for shortness of breath. Negative for chest tightness, cough, sputum production, bloody sputum, COPD, stridor, wheezing and asthma.    Gastrointestinal: Negative for abdominal pain, nausea and vomiting.   Musculoskeletal: Negative for muscle ache.   Skin: Negative for rash.   Allergic/Immunologic: Negative for seasonal allergies and asthma.   Neurological: Negative for headaches.   Hematologic/Lymphatic: Negative for swollen lymph nodes.       Objective:      Physical Exam   Constitutional:  Non-toxic appearance. She does not appear ill. No distress.   Cardiovascular: Tachycardia present.   Pulses:       Radial pulses are 2+ on the right side and 2+ on the left side.   Pulmonary/Chest: No stridor. No respiratory distress. She has no decreased breath sounds. She has no wheezes. She has no rhonchi.   Skin: Skin is not diaphoretic.       Patient was seen limited PE due to state of emergency for the COVID-19 outbreak.  X-ray Chest Pa And Lateral    Result Date: 7/23/2020  EXAMINATION: XR CHEST PA AND LATERAL CLINICAL HISTORY: sob; Shortness of breath TECHNIQUE: PA and lateral views of the chest were performed. COMPARISON: None FINDINGS: Patchy bilateral peripheral airspace disease is evident concerning for pneumonia.  The heart is not enlarged.  The mild degenerative changes are seen in the spine. This report was flagged in Epic as abnormal.     Patchy bilateral airspace disease particularly on the right concerning for areas of pneumonia.  Follow-up is advised. Electronically signed by: Reinaldo Franco MD Date:    07/23/2020 Time:    17:10    Assessment:       1. SOB (shortness of breath)    2. Cough    3. Generalized body aches    4. Pneumonia due to infectious organism, unspecified laterality, unspecified part of lung        Plan:       Start albuterol.  Doxy and amoxicillin to treat for possible bacterial pneumonia.  covid pending- continue to quarantine.   Close  follow up with pcp.      SOB (shortness of breath)  -     X-Ray Chest PA And Lateral; Future; Expected date: 07/23/2020    Cough  -     X-Ray Chest PA And Lateral; Future; Expected date: 07/23/2020    Generalized body aches  -     X-Ray Chest PA And Lateral; Future; Expected date: 07/23/2020    Pneumonia due to infectious organism, unspecified laterality, unspecified part of lung  -     doxycycline (VIBRAMYCIN) 100 MG Cap; Take 1 capsule (100 mg total) by mouth 2 (two) times daily. for 7 days  Dispense: 14 capsule; Refill: 0  -     amoxicillin (AMOXIL) 500 MG capsule; Take 2 capsules (1,000 mg total) by mouth 3 (three) times daily. for 7 days  Dispense: 42 capsule; Refill: 0      Patient Instructions   Follow up with your doctor in a few days.  Return to the urgent care or go to the ER if symptoms get worse.      Start albuterol every 6 hours as needed.  Deep breathing,   Hydrate, electrolytes (such as pedialyte) also... (vit D3, zinc, vit c, pepcid)  Continue antihistamine or flonase steroid spray.      Viral Upper Respiratory Illness (Adult)  You have a viral upper respiratory illness (URI), which is another term for the common cold. This illness is contagious during the first few days. It is spread through the air by coughing and sneezing. It may also be spread by direct contact (touching the sick person and then touching your own eyes, nose, or mouth). Frequent handwashing will decrease risk of spread. Most viral illnesses go away within 7 to 10 days with rest and simple home remedies. Sometimes the illness may last for several weeks. Antibiotics will not kill a virus, and they are generally not prescribed for this condition.    Home care  · If symptoms are severe, rest at home for the first 2 to 3 days. When you resume activity, don't let yourself get too tired.  · Avoid being exposed to cigarette smoke (yours or others).  · You may use acetaminophen or ibuprofen to control pain and fever, unless another  medicine was prescribed. (Note: If you have chronic liver or kidney disease, have ever had a stomach ulcer or gastrointestinal bleeding, or are taking blood-thinning medicines, talk with your healthcare provider before using these medicines.) Aspirin should never be given to anyone under 18 years of age who is ill with a viral infection or fever. It may cause severe liver or brain damage.  · Your appetite may be poor, so a light diet is fine. Avoid dehydration by drinking 6 to 8 glasses of fluids per day (water, soft drinks, juices, tea, or soup). Extra fluids will help loosen secretions in the nose and lungs.  · Over-the-counter cold medicines will not shorten the length of time youre sick, but they may be helpful for the following symptoms: cough, sore throat, and nasal and sinus congestion. (Note: Do not use decongestants if you have high blood pressure.)  Follow-up care  Follow up with your healthcare provider, or as advised.  When to seek medical advice  Call your healthcare provider right away if any of these occur:  · Cough with lots of colored sputum (mucus)  · Severe headache; face, neck, or ear pain  · Difficulty swallowing due to throat pain  · Fever of 100.4°F (38°C)  Call 911, or get immediate medical care  Call emergency services right away if any of these occur:  · Chest pain, shortness of breath, wheezing, or difficulty breathing  · Coughing up blood  · Inability to swallow due to throat pain  Date Last Reviewed: 9/13/2015  © 3145-1851 [employer]. [address] [place] [address], [place] [address]. All rights reserved. This information is not intended as a substitute for professional medical care. Always follow your healthcare professional's instructions.

## 2020-08-01 PROBLEM — J12.82 PNEUMONIA DUE TO COVID-19 VIRUS: Status: ACTIVE | Noted: 2020-08-01

## 2020-08-01 PROBLEM — U07.1 PNEUMONIA DUE TO COVID-19 VIRUS: Status: ACTIVE | Noted: 2020-08-01

## 2020-08-01 PROBLEM — R09.1 PLEURITIS: Status: ACTIVE | Noted: 2020-08-01

## 2020-08-31 ENCOUNTER — OFFICE VISIT (OUTPATIENT)
Dept: OBSTETRICS AND GYNECOLOGY | Facility: CLINIC | Age: 55
End: 2020-08-31
Payer: COMMERCIAL

## 2020-08-31 ENCOUNTER — TELEPHONE (OUTPATIENT)
Dept: OBSTETRICS AND GYNECOLOGY | Facility: CLINIC | Age: 55
End: 2020-08-31

## 2020-08-31 VITALS
HEIGHT: 65 IN | WEIGHT: 172 LBS | BODY MASS INDEX: 28.66 KG/M2 | DIASTOLIC BLOOD PRESSURE: 86 MMHG | SYSTOLIC BLOOD PRESSURE: 128 MMHG

## 2020-08-31 DIAGNOSIS — N76.0 ACUTE VAGINITIS: Primary | ICD-10-CM

## 2020-08-31 LAB
CANDIDA RRNA VAG QL PROBE: POSITIVE
G VAGINALIS RRNA GENITAL QL PROBE: POSITIVE
T VAGINALIS RRNA GENITAL QL PROBE: NEGATIVE

## 2020-08-31 PROCEDURE — 99214 PR OFFICE/OUTPT VISIT, EST, LEVL IV, 30-39 MIN: ICD-10-PCS | Mod: S$GLB,,, | Performed by: NURSE PRACTITIONER

## 2020-08-31 PROCEDURE — 3008F PR BODY MASS INDEX (BMI) DOCUMENTED: ICD-10-PCS | Mod: CPTII,S$GLB,, | Performed by: NURSE PRACTITIONER

## 2020-08-31 PROCEDURE — 87480 CANDIDA DNA DIR PROBE: CPT

## 2020-08-31 PROCEDURE — 99214 OFFICE O/P EST MOD 30 MIN: CPT | Mod: S$GLB,,, | Performed by: NURSE PRACTITIONER

## 2020-08-31 PROCEDURE — 99999 PR PBB SHADOW E&M-EST. PATIENT-LVL III: ICD-10-PCS | Mod: PBBFAC,,, | Performed by: NURSE PRACTITIONER

## 2020-08-31 PROCEDURE — 87591 N.GONORRHOEAE DNA AMP PROB: CPT

## 2020-08-31 PROCEDURE — 3008F BODY MASS INDEX DOCD: CPT | Mod: CPTII,S$GLB,, | Performed by: NURSE PRACTITIONER

## 2020-08-31 PROCEDURE — 99999 PR PBB SHADOW E&M-EST. PATIENT-LVL III: CPT | Mod: PBBFAC,,, | Performed by: NURSE PRACTITIONER

## 2020-08-31 PROCEDURE — 87510 GARDNER VAG DNA DIR PROBE: CPT

## 2020-08-31 RX ORDER — FLUCONAZOLE 150 MG/1
150 TABLET ORAL
Qty: 2 TABLET | Refills: 0 | Status: SHIPPED | OUTPATIENT
Start: 2020-08-31 | End: 2020-09-04

## 2020-08-31 NOTE — TELEPHONE ENCOUNTER
Swing pt, found out her  has been cheating on her and has vaginal itching and discharge and wants to be seen as soon as possible.    Routing comment      Spoke with pt and she states over the weekend she started with a discharge and irritation.  She took a Diflucan on Saturday with no improvement.   She also found out that her partner was cheating on her and would like to come in for std testing.     Appt scheduled at the [facility] walk in clinic.    Pt verbalized understanding.

## 2020-08-31 NOTE — PROGRESS NOTES
CC: Vaginal Discharge    Lola Mann is a 55 y.o. female  presents with complaint of vaginal discharge for 5 days.  She reports itching. She denies odor.  She states the discharge is white.  Alleviating factors: None. No new sexual partners. Desires STD testing, possible STD exposure.    ROS:  GENERAL: No fever, chills, fatigability or weight loss.  VULVAR: No pain, no lesions and no itching.  VAGINAL: No relaxation, itching and discharge, no abnormal bleeding and no lesions.  ABDOMEN: No abdominal pain. Denies nausea. Denies vomiting. No diarrhea. No constipation  BREAST: Denies pain. No lumps. No discharge.  URINARY: No incontinence, no nocturia, no frequency and no dysuria.  CARDIOVASCULAR: No chest pain. No shortness of breath. No leg cramps.  NEUROLOGICAL: No headaches. No vision changes.    PHYSICAL EXAM:  VULVA: normal appearing vulva with no masses, tenderness or lesions. Moderate erythema.  VAGINA: normal appearing vagina with normal color and moderate amount of thick, white discharge, no lesions   CERVIX: normal appearing cervix without discharge or lesions   UTERUS: uterus is normal size, shape, consistency and nontender   ADNEXA: normal adnexa in size, nontender and no masses    ASSESSMENT and PLAN:    ICD-10-CM ICD-9-CM    1. Acute vaginitis  N76.0 616.10 Vaginosis Screen by DNA Probe      C. trachomatis/N. gonorrhoeae by AMP DNA [place]; Urine      fluconazole (DIFLUCAN) 150 MG Tab     Affirm  GCCT urine    Diflucan x 2 doses    Patient was counseled today on vaginitis prevention including :  a. avoiding feminine products such as deoderant soaps, body wash, bubble bath, douches, scented toilet paper, deoderant tampons or pads, feminine wipes, chronic pad use, etc.  b. avoiding other vulvovaginal irritants such as long hot baths, humidity, tight, synthetic clothing, chlorine and sitting around in wet bathing suits  c. wearing cotton underwear, avoiding thong underwear and no underwear to  bed  d. taking showers instead of baths and use a hair dryer on cool setting afterwards to dry  e. wearing cotton to exercise and shower immediately after exercise and change clothes  f. using polyurethane condoms without spermicide if sexually active and symptoms are triggered by intercourse    FOLLOW UP: PRN lack of improvement.      Jennifer Coughlin, FNP-C

## 2020-09-01 DIAGNOSIS — N76.0 BACTERIAL VAGINOSIS: Primary | ICD-10-CM

## 2020-09-01 DIAGNOSIS — B96.89 BACTERIAL VAGINOSIS: Primary | ICD-10-CM

## 2020-09-01 RX ORDER — TINIDAZOLE 500 MG/1
2 TABLET ORAL
Qty: 8 TABLET | Refills: 0 | Status: SHIPPED | OUTPATIENT
Start: 2020-09-01 | End: 2020-09-03

## 2020-09-03 LAB
C TRACH RRNA SPEC QL NAA+PROBE: NEGATIVE
N GONORRHOEA RRNA SPEC QL NAA+PROBE: NEGATIVE

## 2020-09-09 ENCOUNTER — TELEPHONE (OUTPATIENT)
Dept: OBSTETRICS AND GYNECOLOGY | Facility: CLINIC | Age: 55
End: 2020-09-09

## 2020-09-09 RX ORDER — FLUCONAZOLE 150 MG/1
150 TABLET ORAL DAILY
Qty: 1 TABLET | Refills: 0 | Status: SHIPPED | OUTPATIENT
Start: 2020-09-09 | End: 2020-09-10

## 2020-09-09 RX ORDER — TERCONAZOLE 8 MG/G
1 CREAM VAGINAL NIGHTLY
Qty: 20 G | Refills: 0 | Status: SHIPPED | OUTPATIENT
Start: 2020-09-09 | End: 2020-10-07

## 2020-09-09 NOTE — TELEPHONE ENCOUNTER
Pt saw Jennifer 8/31 took DIflucan and Tindamax for yeast and BV. Still C/o itching and discharge.  Thinks she still has a yeast infection.     Recommended Terazol but she doesn't like using the cream so she is requesting another dose of Diflucan and Terazol incase another Diflucan doesn't work.

## 2020-09-30 ENCOUNTER — TELEPHONE (OUTPATIENT)
Dept: OBSTETRICS AND GYNECOLOGY | Facility: CLINIC | Age: 55
End: 2020-09-30

## 2020-09-30 ENCOUNTER — OFFICE VISIT (OUTPATIENT)
Dept: OBSTETRICS AND GYNECOLOGY | Facility: CLINIC | Age: 55
End: 2020-09-30
Payer: COMMERCIAL

## 2020-09-30 VITALS
DIASTOLIC BLOOD PRESSURE: 74 MMHG | BODY MASS INDEX: 28.65 KG/M2 | WEIGHT: 171.94 LBS | SYSTOLIC BLOOD PRESSURE: 128 MMHG | HEIGHT: 65 IN

## 2020-09-30 DIAGNOSIS — N76.0 ACUTE VAGINITIS: Primary | ICD-10-CM

## 2020-09-30 PROCEDURE — 87510 GARDNER VAG DNA DIR PROBE: CPT

## 2020-09-30 PROCEDURE — 3008F PR BODY MASS INDEX (BMI) DOCUMENTED: ICD-10-PCS | Mod: CPTII,S$GLB,, | Performed by: PHYSICIAN ASSISTANT

## 2020-09-30 PROCEDURE — 3008F BODY MASS INDEX DOCD: CPT | Mod: CPTII,S$GLB,, | Performed by: PHYSICIAN ASSISTANT

## 2020-09-30 PROCEDURE — 99999 PR PBB SHADOW E&M-EST. PATIENT-LVL III: ICD-10-PCS | Mod: PBBFAC,,, | Performed by: PHYSICIAN ASSISTANT

## 2020-09-30 PROCEDURE — 99214 PR OFFICE/OUTPT VISIT, EST, LEVL IV, 30-39 MIN: ICD-10-PCS | Mod: S$GLB,,, | Performed by: PHYSICIAN ASSISTANT

## 2020-09-30 PROCEDURE — 99214 OFFICE O/P EST MOD 30 MIN: CPT | Mod: S$GLB,,, | Performed by: PHYSICIAN ASSISTANT

## 2020-09-30 PROCEDURE — 99999 PR PBB SHADOW E&M-EST. PATIENT-LVL III: CPT | Mod: PBBFAC,,, | Performed by: PHYSICIAN ASSISTANT

## 2020-09-30 PROCEDURE — 87480 CANDIDA DNA DIR PROBE: CPT

## 2020-09-30 RX ORDER — METRONIDAZOLE 7.5 MG/G
1 GEL VAGINAL DAILY
Qty: 70 G | Refills: 4 | Status: SHIPPED | OUTPATIENT
Start: 2020-09-30 | End: 2020-10-05

## 2020-09-30 RX ORDER — TINIDAZOLE 500 MG/1
2 TABLET ORAL DAILY
Qty: 8 TABLET | Refills: 0 | Status: SHIPPED | OUTPATIENT
Start: 2020-09-30 | End: 2020-09-30 | Stop reason: ALTCHOICE

## 2020-09-30 NOTE — TELEPHONE ENCOUNTER
Pt still has vaginal complaints after taking a 2nd round of medication.  Advised she needs an appt.  Scheduled with OBGYN urgent care today.

## 2020-09-30 NOTE — PROGRESS NOTES
Lola Mann is a 55 y.o. female  presents with complaint of vaginal discharge for 1 week.  She reports itching.  denies odor.  She states the discharge is creamy, but also states she started her cycle after having none for 11 months. Pt states that she used to get these symptoms before cycle. Discussed vaginitis prevention. No other concerns or complaints today.       Past Medical History:   Diagnosis Date    Abnormal Pap smear of cervix 2014    Hpv +     Acid reflux     DUB (dysfunctional uterine bleeding)     History of endometriosis     History of HPV infection 2014    Pap Hpv +    Neck and shoulder pain     Rheumatoid arthritis     Right Olecranon Bursitis     Seasonal allergies     Thyroid disease     Hypo    Venous insufficiency of leg      Past Surgical History:   Procedure Laterality Date    APPENDECTOMY  1985     SECTION  , 2004    x 2     CHOLECYSTECTOMY  2009    COLONOSCOPY  2017    Normal  w/ Dr Moore  (Rtn 10 yrs)    DILATION AND CURETTAGE OF UTERUS  08/15/2013    DUB    ENDOMETRIAL ABLATION N/A 08/15/2013    [facility] -- Dr Mcnamara    ENDOMETRIAL BIOPSY      LASER ABLATION  2017    Endovenous Laser Ablation-Leg    PELVIC LAPAROSCOPY  1994 and   Dr Ferro    TUBAL LIGATION  2004    VARICOSE VEIN SURGERY       Social History     Tobacco Use    Smoking status: Never Smoker    Smokeless tobacco: Never Used   Substance Use Topics    Alcohol use: Yes     Alcohol/week: 1.0 standard drinks     Types: 1 Glasses of wine per week     Frequency: 2-4 times a month     Drinks per session: 3 or 4     Binge frequency: Never     Comment: social    Drug use: No     Family History   Problem Relation Age of Onset    Hypertension Maternal Grandmother     Heart disease Maternal Grandmother     Hypertension Sister     Hypertension Brother     Heart disease Maternal Grandfather 42        death due to MI    Cancer Paternal  "Grandmother     Cancer Paternal Grandfather         liver cancer    Stroke Brother     Seizures Mother     Hypertension Mother     Thyroid disease Mother     Alzheimer's disease Mother     Cancer Father         melanoma    Heart disease Father     Hyperlipidemia Father     Melanoma Father     Lung cancer Father     Breast cancer Other     Colon cancer Neg Hx     Ovarian cancer Neg Hx      OB History    Para Term  AB Living   2 2 2 0 0 2   SAB TAB Ectopic Multiple Live Births   0 0 0   2      # Outcome Date GA Lbr Taj/2nd Weight Sex Delivery Anes PTL Lv   2 Term 12/15/04 39w0d  4.139 kg (9 lb 2 oz) M CS-LTranv Spinal  EARLENE   1 Term 97 43w0d  4.196 kg (9 lb 4 oz) M Vag-Spont Spinal  EARLENE      Obstetric Comments   Menarche ~ 16       /74   Ht 5' 5" (1.651 m)   Wt 78 kg (171 lb 15.3 oz)   BMI 28.62 kg/m²     ROS:  GENERAL: No fever, chills, fatigability or weight loss.  VULVAR: No pain, no lesions and no itching.  VAGINAL: No relaxation, no itching, no discharge, no abnormal bleeding and no lesions.  ABDOMEN: No abdominal pain. Denies nausea. Denies vomiting. No diarrhea. No constipation  BREAST: Denies pain. No lumps. No discharge.  URINARY: No incontinence, no nocturia, no frequency and no dysuria.  CARDIOVASCULAR: No chest pain. No shortness of breath. No leg cramps.  NEUROLOGICAL: No headaches. No vision changes.    PHYSICAL EXAM:  VULVA: normal appearing vulva with no masses, tenderness or lesions   VAGINA: normal appearing vagina with normal color. Yellow, brown discharge, no lesions   CERVIX: normal appearing cervix without discharge or lesions   UTERUS: uterus is normal size, shape, consistency and nontender   ADNEXA: normal adnexa in size, nontender and no masses    ASSESSMENT and PLAN:    ICD-10-CM ICD-9-CM    1. Acute vaginitis  N76.0 616.10 Vaginosis Screen by DNA Probe      tinidazole (TINDAMAX) 500 MG tablet         Patient was counseled today on vaginitis " prevention including :  a. avoiding feminine products such as deoderant soaps, body wash, bubble bath, douches, scented toilet paper, deoderant tampons or pads, feminine wipes, chronic pad use, etc.  b. avoiding other vulvovaginal irritants such as long hot baths, humidity, tight, synthetic clothing, chlorine and sitting around in wet bathing suits  c. wearing cotton underwear, avoiding thong underwear and no underwear to bed  d. taking showers instead of baths and use a hair dryer on cool setting afterwards to dry  e. wearing cotton to exercise and shower immediately after exercise and change clothes  f. using polyurethane condoms without spermicide if sexually active and symptoms are triggered by intercourse    FOLLOW UP: PRN lack of improvement.

## 2020-09-30 NOTE — PATIENT INSTRUCTIONS
Vaginitis Prevention:  a. avoiding feminine products such as deoderant soaps, body wash, bubble bath, douches, scented toilet paper, deoderant tampons or pads, feminine wipes, chronic pad use, etc.  b. avoiding other vulvovaginal irritants such as long hot baths, humidity, tight, synthetic clothing, chlorine and sitting around in wet bathing suits  c. wearing cotton underwear, avoiding thong underwear and no underwear to bed  d. taking showers instead of baths and use a hair dryer on cool setting afterwards to dry  e. wearing cotton to exercise and shower immediately after exercise and change clothes  f. using polyurethane condoms without spermicide if sexually active and symptoms are triggered by intercourse

## 2020-10-01 ENCOUNTER — TELEPHONE (OUTPATIENT)
Dept: OBSTETRICS AND GYNECOLOGY | Facility: CLINIC | Age: 55
End: 2020-10-01

## 2020-10-01 ENCOUNTER — PATIENT MESSAGE (OUTPATIENT)
Dept: OBSTETRICS AND GYNECOLOGY | Facility: CLINIC | Age: 55
End: 2020-10-01

## 2020-10-07 ENCOUNTER — OFFICE VISIT (OUTPATIENT)
Dept: OBSTETRICS AND GYNECOLOGY | Facility: CLINIC | Age: 55
End: 2020-10-07
Attending: OBSTETRICS & GYNECOLOGY
Payer: COMMERCIAL

## 2020-10-07 ENCOUNTER — APPOINTMENT (OUTPATIENT)
Dept: RADIOLOGY | Facility: OTHER | Age: 55
End: 2020-10-07
Attending: INTERNAL MEDICINE
Payer: COMMERCIAL

## 2020-10-07 ENCOUNTER — LAB VISIT (OUTPATIENT)
Dept: LAB | Facility: HOSPITAL | Age: 55
End: 2020-10-07
Attending: OBSTETRICS & GYNECOLOGY
Payer: COMMERCIAL

## 2020-10-07 VITALS
BODY MASS INDEX: 28.98 KG/M2 | BODY MASS INDEX: 28.65 KG/M2 | HEIGHT: 65 IN | WEIGHT: 171.94 LBS | SYSTOLIC BLOOD PRESSURE: 122 MMHG | DIASTOLIC BLOOD PRESSURE: 78 MMHG | HEIGHT: 65 IN | WEIGHT: 173.94 LBS

## 2020-10-07 DIAGNOSIS — Z12.39 BREAST CANCER SCREENING: ICD-10-CM

## 2020-10-07 DIAGNOSIS — Z12.31 VISIT FOR SCREENING MAMMOGRAM: ICD-10-CM

## 2020-10-07 DIAGNOSIS — N95.1 PERIMENOPAUSAL: ICD-10-CM

## 2020-10-07 DIAGNOSIS — Z11.51 SCREENING FOR HUMAN PAPILLOMAVIRUS: ICD-10-CM

## 2020-10-07 DIAGNOSIS — Z12.4 SCREENING FOR MALIGNANT NEOPLASM OF CERVIX: ICD-10-CM

## 2020-10-07 DIAGNOSIS — Z01.419 ENCOUNTER FOR GYNECOLOGICAL EXAMINATION: Primary | ICD-10-CM

## 2020-10-07 LAB
ESTRADIOL SERPL-MCNC: 18 PG/ML
FSH SERPL-ACNC: 69.8 MIU/ML

## 2020-10-07 PROCEDURE — 77067 MAMMO DIGITAL SCREENING BILAT WITH TOMO: ICD-10-PCS | Mod: 26,,, | Performed by: RADIOLOGY

## 2020-10-07 PROCEDURE — 77063 MAMMO DIGITAL SCREENING BILAT WITH TOMO: ICD-10-PCS | Mod: 26,,, | Performed by: RADIOLOGY

## 2020-10-07 PROCEDURE — 3008F BODY MASS INDEX DOCD: CPT | Mod: CPTII,S$GLB,, | Performed by: OBSTETRICS & GYNECOLOGY

## 2020-10-07 PROCEDURE — 88175 CYTOPATH C/V AUTO FLUID REDO: CPT

## 2020-10-07 PROCEDURE — 83001 ASSAY OF GONADOTROPIN (FSH): CPT

## 2020-10-07 PROCEDURE — 99396 PREV VISIT EST AGE 40-64: CPT | Mod: S$GLB,,, | Performed by: OBSTETRICS & GYNECOLOGY

## 2020-10-07 PROCEDURE — 77063 BREAST TOMOSYNTHESIS BI: CPT | Mod: 26,,, | Performed by: RADIOLOGY

## 2020-10-07 PROCEDURE — 87624 HPV HI-RISK TYP POOLED RSLT: CPT

## 2020-10-07 PROCEDURE — 99396 PR PREVENTIVE VISIT,EST,40-64: ICD-10-PCS | Mod: S$GLB,,, | Performed by: OBSTETRICS & GYNECOLOGY

## 2020-10-07 PROCEDURE — 82670 ASSAY OF TOTAL ESTRADIOL: CPT

## 2020-10-07 PROCEDURE — 77067 SCR MAMMO BI INCL CAD: CPT | Mod: TC,PN

## 2020-10-07 PROCEDURE — 3008F PR BODY MASS INDEX (BMI) DOCUMENTED: ICD-10-PCS | Mod: CPTII,S$GLB,, | Performed by: OBSTETRICS & GYNECOLOGY

## 2020-10-07 PROCEDURE — 99999 PR PBB SHADOW E&M-EST. PATIENT-LVL III: ICD-10-PCS | Mod: PBBFAC,,, | Performed by: OBSTETRICS & GYNECOLOGY

## 2020-10-07 PROCEDURE — 77067 SCR MAMMO BI INCL CAD: CPT | Mod: 26,,, | Performed by: RADIOLOGY

## 2020-10-07 PROCEDURE — 99999 PR PBB SHADOW E&M-EST. PATIENT-LVL III: CPT | Mod: PBBFAC,,, | Performed by: OBSTETRICS & GYNECOLOGY

## 2020-10-07 RX ORDER — PREDNISONE 20 MG/1
TABLET ORAL
COMMUNITY
Start: 2020-09-30 | End: 2020-10-07

## 2020-10-07 RX ORDER — TINIDAZOLE 500 MG/1
TABLET ORAL
COMMUNITY
Start: 2020-09-30 | End: 2020-12-11

## 2020-10-07 NOTE — PROGRESS NOTES
Subjective:       Patient ID: Lola Mann is a 55 y.o. female.    Chief Complaint:  Well Woman (Annual --  pap/hpv 17, Neg  --  MMG Today 10-7-20  --  Dexa, Never  --  Colon, Normal (10yrs) )      History of Present Illness.  Lola Mann is a 55 y.o. female.  She has no breast or urinary symptoms.  She has no postcoital bleeding, pelvic pain or vaginal discharge.  She went 11 months without a period and one just came.  She has had hot flashes off and on.    GYN & OB History  Patient's last menstrual period was 2020 (approximate).   Pap: 2017  Normal HPV negative  Mammogram: 10/7/2020  Colonoscopy:  Normal  DEXA: No  PCP:  Dr. Alvarez  Routine Labs:   2019    OB History    Para Term  AB Living   2 2 2 0 0 2   SAB TAB Ectopic Multiple Live Births   0 0 0   2      # Outcome Date GA Lbr Taj/2nd Weight Sex Delivery Anes PTL Lv   2 Term 12/15/04 39w0d  4.139 kg (9 lb 2 oz) M CS-LTranv Spinal  EARLENE   1 Term 97 43w0d  4.196 kg (9 lb 4 oz) M Vag-Spont Spinal  EARLENE      Obstetric Comments   Menarche ~ 16       Past Medical History:   Diagnosis Date    Abnormal Pap smear of cervix 2014    Hpv +     Acid reflux     DUB (dysfunctional uterine bleeding)     H/O tubal ligation     History of endometriosis     History of HPV infection 2014    Pap Hpv +    Neck and shoulder pain     Rheumatoid arthritis     Right Olecranon Bursitis     Seasonal allergies     Thyroid disease     Hypo    Venous insufficiency of leg      Past Surgical History:   Procedure Laterality Date    APPENDECTOMY  1985     SECTION  , 2004    x 2     CHOLECYSTECTOMY  2009    COLONOSCOPY  2017    Normal  w/ Dr Moore  (Rtn 10 yrs)    DILATION AND CURETTAGE OF UTERUS  08/15/2013    DUB    ENDOMETRIAL ABLATION N/A 08/15/2013    [facility] -- Dr Mcnamara    ENDOMETRIAL BIOPSY      LASER ABLATION  2017    Endovenous Laser Ablation-Leg    PELVIC LAPAROSCOPY   1994 1994 and 1996  Dr Ferro    TUBAL LIGATION  2004    VARICOSE VEIN SURGERY       Family History   Problem Relation Age of Onset    Hypertension Maternal Grandmother     Heart disease Maternal Grandmother     Hypertension Sister     Hypertension Brother     Heart disease Maternal Grandfather 42        death due to MI    Cancer Paternal Grandmother     Cancer Paternal Grandfather         liver cancer    Stroke Brother     Seizures Mother     Hypertension Mother     Thyroid disease Mother     Alzheimer's disease Mother     Cancer Father         melanoma    Heart disease Father     Hyperlipidemia Father     Melanoma Father     Lung cancer Father     Breast cancer Other     Colon cancer Neg Hx     Ovarian cancer Neg Hx      Social History     Tobacco Use    Smoking status: Never Smoker    Smokeless tobacco: Never Used   Substance Use Topics    Alcohol use: Yes     Alcohol/week: 1.0 standard drinks     Types: 1 Glasses of wine per week     Frequency: 2-4 times a month     Drinks per session: 3 or 4     Binge frequency: Never     Comment: social    Drug use: No       Current Outpatient Medications:     ARMOUR THYROID 120 mg Tab, Take 1 tablet by mouth once daily., Disp: , Rfl: 3    ergocalciferol (ERGOCALCIFEROL) 50,000 unit Cap, , Disp: , Rfl:     fexofenadine-pseudoephedrine (ALLEGRA-D 24) 180-240 mg per 24 hr tablet, Take 1 tablet by mouth as needed. , Disp: , Rfl:     hydroxychloroquine (PLAQUENIL) 200 mg tablet, Take 200 mg by mouth once daily., Disp: , Rfl:     ibuprofen (ADVIL,MOTRIN) 600 MG tablet, Take 1 tablet (600 mg total) by mouth every 8 (eight) hours as needed for Pain., Disp: 60 tablet, Rfl: 0    albuterol (PROVENTIL/VENTOLIN HFA) 90 mcg/actuation inhaler, Inhale 2 puffs into the lungs every 4 (four) hours as needed for Wheezing or Shortness of Breath. (Patient not taking: Reported on 10/7/2020), Disp: 18 g, Rfl: 3    SOOLANTRA 1 % Crea, , Disp: , Rfl:      "tinidazole (TINDAMAX) 500 MG tablet, , Disp: , Rfl:     Review of patient's allergies indicates:   Allergen Reactions    Morphine Shortness Of Breath     Other reaction(s): Asthma       Review of Systems  Review of Systems   Constitutional: Negative for fatigue.   HENT: Negative for trouble swallowing.    Eyes: Negative for visual disturbance.   Respiratory: Negative for cough and shortness of breath.    Cardiovascular: Negative for chest pain.   Gastrointestinal: Negative for abdominal distention, abdominal pain, blood in stool, nausea and vomiting.   Genitourinary: Negative for difficulty urinating, dyspareunia, dysuria, flank pain, frequency, hematuria, pelvic pain, urgency, vaginal bleeding, vaginal discharge and vaginal pain.   Musculoskeletal: Negative for arthralgias.   Skin: Negative for rash.   Neurological: Negative for dizziness and headaches.   Psychiatric/Behavioral: Negative for sleep disturbance. The patient is not nervous/anxious.         Objective:     Vitals:    10/07/20 0955   BP: 122/78   Weight: 78 kg (171 lb 15.3 oz)   Height: 5' 5" (1.651 m)   PainSc: 0-No pain     Body mass index is 28.62 kg/m².    Physical Exam:   Constitutional: She is oriented to person, place, and time. She appears well-developed and well-nourished.    HENT:   Head: Normocephalic.     Neck: Normal range of motion. No thyromegaly present.     Pulmonary/Chest: Right breast exhibits no mass, no nipple discharge, no skin change, no tenderness and no swelling. Left breast exhibits no mass, no nipple discharge, no skin change, no tenderness and no swelling. Breasts are symmetrical.        Abdominal: Soft. Normal appearance and bowel sounds are normal. She exhibits no distension. There is no abdominal tenderness.     Genitourinary:    Vagina and uterus normal.      Pelvic exam was performed with patient supine.   There is no rash, tenderness, lesion or injury on the right labia. There is no rash, tenderness, lesion or injury on " the left labia. Cervix is normal. Right adnexum displays no mass, no tenderness and no fullness. Left adnexum displays no mass, no tenderness and no fullness. No erythema in the vagina. Cervix exhibits no motion tenderness and no discharge. negative for vaginal discharge          Musculoskeletal: Normal range of motion.      Lymphadenopathy:        Right: No supraclavicular adenopathy present.        Left: No supraclavicular adenopathy present.    Neurological: She is alert and oriented to person, place, and time.    Skin: Skin is warm and dry.    Psychiatric: She has a normal mood and affect.        Assessment/ Plan:     Encounter for gynecological examination    Screening for malignant neoplasm of cervix  -     Liquid-Based Pap Smear, Screening    Screening for human papillomavirus  -     HPV High Risk Genotypes, PCR    Perimenopausal  -     Follicle Stimulating Hormone; Future; Expected date: 10/07/2020  -     Estradiol; Future; Expected date: 10/07/2020        Routine pap smears.  Self breast exam and mammography discussed  Routine colonoscopy discussed.  Diet and exercise discussed.  Recommend calcium 1200 mg and vitamin D3 1000 IU daily and routine bone mineral density testing.  Yearly influenza vaccination discussed.  Follow-up with me in 1 year

## 2020-10-14 LAB
HPV HR 12 DNA SPEC QL NAA+PROBE: POSITIVE
HPV16 AG SPEC QL: NEGATIVE
HPV18 DNA SPEC QL NAA+PROBE: NEGATIVE

## 2020-10-15 ENCOUNTER — PATIENT MESSAGE (OUTPATIENT)
Dept: OBSTETRICS AND GYNECOLOGY | Facility: CLINIC | Age: 55
End: 2020-10-15

## 2020-10-29 DIAGNOSIS — B96.89 BV (BACTERIAL VAGINOSIS): Primary | ICD-10-CM

## 2020-10-29 DIAGNOSIS — N76.0 BV (BACTERIAL VAGINOSIS): Primary | ICD-10-CM

## 2020-10-29 DIAGNOSIS — B37.31 CANDIDIASIS OF VAGINA: ICD-10-CM

## 2020-10-29 RX ORDER — METRONIDAZOLE 500 MG/1
500 TABLET ORAL 2 TIMES DAILY
Qty: 14 TABLET | Refills: 0 | Status: SHIPPED | OUTPATIENT
Start: 2020-10-29 | End: 2020-11-05

## 2020-10-29 RX ORDER — FLUCONAZOLE 150 MG/1
TABLET ORAL
Qty: 2 TABLET | Refills: 1 | Status: SHIPPED | OUTPATIENT
Start: 2020-10-29 | End: 2020-12-11

## 2020-11-02 ENCOUNTER — TELEPHONE (OUTPATIENT)
Dept: OBSTETRICS AND GYNECOLOGY | Facility: CLINIC | Age: 55
End: 2020-11-02

## 2020-11-02 ENCOUNTER — OFFICE VISIT (OUTPATIENT)
Dept: URGENT CARE | Facility: CLINIC | Age: 55
End: 2020-11-02
Payer: COMMERCIAL

## 2020-11-02 ENCOUNTER — CLINICAL SUPPORT (OUTPATIENT)
Dept: URGENT CARE | Facility: CLINIC | Age: 55
End: 2020-11-02
Payer: COMMERCIAL

## 2020-11-02 VITALS — HEIGHT: 65 IN | TEMPERATURE: 98 F | BODY MASS INDEX: 28.65 KG/M2 | WEIGHT: 171.94 LBS | RESPIRATION RATE: 18 BRPM

## 2020-11-02 DIAGNOSIS — Z20.822 EXPOSURE TO COVID-19 VIRUS: Primary | ICD-10-CM

## 2020-11-02 DIAGNOSIS — Z01.84 ENCOUNTER FOR ANTIBODY RESPONSE EXAMINATION: ICD-10-CM

## 2020-11-02 LAB
CTP QC/QA: YES
FINAL PATHOLOGIC DIAGNOSIS: NORMAL
Lab: NORMAL
SARS-COV-2 RDRP RESP QL NAA+PROBE: NEGATIVE

## 2020-11-02 PROCEDURE — U0002: ICD-10-PCS | Mod: QW,S$GLB,, | Performed by: EMERGENCY MEDICINE

## 2020-11-02 PROCEDURE — U0002 COVID-19 LAB TEST NON-CDC: HCPCS | Mod: QW,S$GLB,, | Performed by: EMERGENCY MEDICINE

## 2020-11-04 DIAGNOSIS — N95.1 PERIMENOPAUSE: Primary | ICD-10-CM

## 2020-11-04 RX ORDER — PROGESTERONE 100 MG/1
CAPSULE ORAL
Qty: 30 CAPSULE | Refills: 11 | Status: SHIPPED | OUTPATIENT
Start: 2020-11-04 | End: 2020-12-11

## 2020-11-13 ENCOUNTER — TELEPHONE (OUTPATIENT)
Dept: OBSTETRICS AND GYNECOLOGY | Facility: CLINIC | Age: 55
End: 2020-11-13

## 2020-11-16 ENCOUNTER — TELEPHONE (OUTPATIENT)
Dept: OBSTETRICS AND GYNECOLOGY | Facility: CLINIC | Age: 55
End: 2020-11-16

## 2020-11-16 NOTE — TELEPHONE ENCOUNTER
Spoke with pt and scheduled Colpo for 1-4-21 at [facility]. Office.    Pt may call and r/s if she is not going out of town the week of 12-28-20

## 2020-11-16 NOTE — TELEPHONE ENCOUNTER
----- Message from Luna Mcnamara MD sent at 11/4/2020 12:09 PM CST -----  Spoke with aki Michael - Schedule colposcopy

## 2020-11-16 NOTE — TELEPHONE ENCOUNTER
Spoke with pt and scheduled Colpo for 1-4-21 at [facility]. Office.     Pt may call and r/s if she is not going out of town the week of 12-28-20

## 2020-12-17 ENCOUNTER — TELEPHONE (OUTPATIENT)
Dept: OBSTETRICS AND GYNECOLOGY | Facility: CLINIC | Age: 55
End: 2020-12-17

## 2021-01-04 ENCOUNTER — PROCEDURE VISIT (OUTPATIENT)
Dept: OBSTETRICS AND GYNECOLOGY | Facility: CLINIC | Age: 56
End: 2021-01-04
Attending: OBSTETRICS & GYNECOLOGY
Payer: COMMERCIAL

## 2021-01-04 VITALS
HEIGHT: 65 IN | SYSTOLIC BLOOD PRESSURE: 126 MMHG | WEIGHT: 180.75 LBS | DIASTOLIC BLOOD PRESSURE: 80 MMHG | BODY MASS INDEX: 30.12 KG/M2

## 2021-01-04 DIAGNOSIS — R87.618 PAP SMEAR ABNORMALITY OF CERVIX/HUMAN PAPILLOMAVIRUS (HPV) POSITIVE: Primary | ICD-10-CM

## 2021-01-04 DIAGNOSIS — Z01.812 PRE-PROCEDURE LAB EXAM: ICD-10-CM

## 2021-01-04 LAB
B-HCG UR QL: NEGATIVE
CTP QC/QA: YES

## 2021-01-04 PROCEDURE — 88305 TISSUE EXAM BY PATHOLOGIST: ICD-10-PCS | Mod: 26,,, | Performed by: PATHOLOGY

## 2021-01-04 PROCEDURE — 88305 TISSUE EXAM BY PATHOLOGIST: CPT | Mod: 26,,, | Performed by: PATHOLOGY

## 2021-01-04 PROCEDURE — 57454 BX/CURETT OF CERVIX W/SCOPE: CPT | Mod: S$GLB,,, | Performed by: OBSTETRICS & GYNECOLOGY

## 2021-01-04 PROCEDURE — 88305 TISSUE EXAM BY PATHOLOGIST: CPT | Performed by: PATHOLOGY

## 2021-01-04 PROCEDURE — 57454 COLPOSCOPY: ICD-10-PCS | Mod: S$GLB,,, | Performed by: OBSTETRICS & GYNECOLOGY

## 2021-01-07 LAB
FINAL PATHOLOGIC DIAGNOSIS: NORMAL
GROSS: NORMAL

## 2021-04-29 ENCOUNTER — PATIENT MESSAGE (OUTPATIENT)
Dept: RESEARCH | Facility: HOSPITAL | Age: 56
End: 2021-04-29

## 2021-10-14 ENCOUNTER — CLINICAL SUPPORT (OUTPATIENT)
Dept: URGENT CARE | Facility: CLINIC | Age: 56
End: 2021-10-14
Payer: COMMERCIAL

## 2021-10-14 DIAGNOSIS — Z01.84 ENCOUNTER FOR ANTIBODY RESPONSE EXAMINATION: ICD-10-CM

## 2021-10-14 DIAGNOSIS — Z11.52 ENCOUNTER FOR SCREENING FOR COVID-19: Primary | ICD-10-CM

## 2021-10-14 LAB
CTP QC/QA: YES
SARS-COV-2 IGG SERPL IA-ACNC: 1495.1 AU/ML
SARS-COV-2 IGG SERPL QL IA: POSITIVE
SARS-COV-2 RDRP RESP QL NAA+PROBE: NEGATIVE

## 2021-10-14 PROCEDURE — 99211 OFF/OP EST MAY X REQ PHY/QHP: CPT | Mod: S$GLB,,, | Performed by: EMERGENCY MEDICINE

## 2021-10-14 PROCEDURE — 86769 SARS-COV-2 COVID-19 ANTIBODY: CPT | Performed by: EMERGENCY MEDICINE

## 2021-10-14 PROCEDURE — 99211 PR OFFICE/OUTPT VISIT, EST, LEVL I: ICD-10-PCS | Mod: S$GLB,,, | Performed by: EMERGENCY MEDICINE

## 2021-10-14 PROCEDURE — U0002 COVID-19 LAB TEST NON-CDC: HCPCS | Mod: QW,S$GLB,, | Performed by: EMERGENCY MEDICINE

## 2021-10-14 PROCEDURE — U0002: ICD-10-PCS | Mod: QW,S$GLB,, | Performed by: EMERGENCY MEDICINE

## 2022-03-22 ENCOUNTER — TELEPHONE (OUTPATIENT)
Dept: OBSTETRICS AND GYNECOLOGY | Facility: CLINIC | Age: 57
End: 2022-03-22

## 2022-03-22 DIAGNOSIS — Z12.31 BREAST CANCER SCREENING BY MAMMOGRAM: Primary | ICD-10-CM

## 2022-04-16 ENCOUNTER — CLINICAL SUPPORT (OUTPATIENT)
Dept: URGENT CARE | Facility: CLINIC | Age: 57
End: 2022-04-16
Payer: COMMERCIAL

## 2022-04-16 DIAGNOSIS — Z20.822 ENCOUNTER FOR LABORATORY TESTING FOR COVID-19 VIRUS: ICD-10-CM

## 2022-04-16 LAB — SARS-COV-2 RNA RESP QL NAA+PROBE: NOT DETECTED

## 2022-04-16 PROCEDURE — 99211 PR OFFICE/OUTPT VISIT, EST, LEVL I: ICD-10-PCS | Mod: S$GLB,CS,, | Performed by: EMERGENCY MEDICINE

## 2022-04-16 PROCEDURE — U0003 INFECTIOUS AGENT DETECTION BY NUCLEIC ACID (DNA OR RNA); SEVERE ACUTE RESPIRATORY SYNDROME CORONAVIRUS 2 (SARS-COV-2) (CORONAVIRUS DISEASE [COVID-19]), AMPLIFIED PROBE TECHNIQUE, MAKING USE OF HIGH THROUGHPUT TECHNOLOGIES AS DESCRIBED BY CMS-2020-01-R: HCPCS | Performed by: EMERGENCY MEDICINE

## 2022-04-16 PROCEDURE — U0005 INFEC AGEN DETEC AMPLI PROBE: HCPCS | Performed by: EMERGENCY MEDICINE

## 2022-04-16 PROCEDURE — 99211 OFF/OP EST MAY X REQ PHY/QHP: CPT | Mod: S$GLB,CS,, | Performed by: EMERGENCY MEDICINE

## 2022-05-04 PROBLEM — Z85.828 HISTORY OF BASAL CELL CARCINOMA: Status: ACTIVE | Noted: 2022-05-04

## 2022-05-04 PROBLEM — E66.9 OBESITY (BMI 30.0-34.9): Status: ACTIVE | Noted: 2022-05-04

## 2022-05-04 PROBLEM — Z80.8 FAMILY HISTORY OF MELANOMA: Status: ACTIVE | Noted: 2022-05-04

## 2022-05-04 PROBLEM — E66.811 OBESITY (BMI 30.0-34.9): Status: ACTIVE | Noted: 2022-05-04

## 2022-08-04 ENCOUNTER — OFFICE VISIT (OUTPATIENT)
Dept: OBSTETRICS AND GYNECOLOGY | Facility: CLINIC | Age: 57
End: 2022-08-04
Attending: OBSTETRICS & GYNECOLOGY
Payer: COMMERCIAL

## 2022-08-04 ENCOUNTER — APPOINTMENT (OUTPATIENT)
Dept: RADIOLOGY | Facility: OTHER | Age: 57
End: 2022-08-04
Attending: OBSTETRICS & GYNECOLOGY
Payer: COMMERCIAL

## 2022-08-04 VITALS
WEIGHT: 194 LBS | DIASTOLIC BLOOD PRESSURE: 78 MMHG | HEIGHT: 65 IN | BODY MASS INDEX: 32.32 KG/M2 | WEIGHT: 195.13 LBS | HEIGHT: 65 IN | SYSTOLIC BLOOD PRESSURE: 120 MMHG | BODY MASS INDEX: 32.51 KG/M2

## 2022-08-04 DIAGNOSIS — Z12.4 SCREENING FOR CERVICAL CANCER: ICD-10-CM

## 2022-08-04 DIAGNOSIS — Z12.31 BREAST CANCER SCREENING BY MAMMOGRAM: ICD-10-CM

## 2022-08-04 DIAGNOSIS — Z13.820 SCREENING FOR OSTEOPOROSIS: ICD-10-CM

## 2022-08-04 DIAGNOSIS — N95.1 MENOPAUSAL AND FEMALE CLIMACTERIC STATES: ICD-10-CM

## 2022-08-04 DIAGNOSIS — Z11.51 SCREENING FOR HUMAN PAPILLOMAVIRUS: ICD-10-CM

## 2022-08-04 DIAGNOSIS — R87.618 PAP SMEAR ABNORMALITY OF CERVIX/HUMAN PAPILLOMAVIRUS (HPV) POSITIVE: Primary | ICD-10-CM

## 2022-08-04 DIAGNOSIS — Z01.419 ENCOUNTER FOR GYNECOLOGICAL EXAMINATION: ICD-10-CM

## 2022-08-04 PROCEDURE — 77067 MAMMO DIGITAL SCREENING BILAT WITH TOMO: ICD-10-PCS | Mod: 26,,, | Performed by: RADIOLOGY

## 2022-08-04 PROCEDURE — 99396 PR PREVENTIVE VISIT,EST,40-64: ICD-10-PCS | Mod: S$GLB,,, | Performed by: OBSTETRICS & GYNECOLOGY

## 2022-08-04 PROCEDURE — 77063 BREAST TOMOSYNTHESIS BI: CPT | Mod: 26,,, | Performed by: RADIOLOGY

## 2022-08-04 PROCEDURE — 1159F PR MEDICATION LIST DOCUMENTED IN MEDICAL RECORD: ICD-10-PCS | Mod: CPTII,S$GLB,, | Performed by: OBSTETRICS & GYNECOLOGY

## 2022-08-04 PROCEDURE — 99999 PR PBB SHADOW E&M-EST. PATIENT-LVL III: CPT | Mod: PBBFAC,,, | Performed by: OBSTETRICS & GYNECOLOGY

## 2022-08-04 PROCEDURE — 1159F MED LIST DOCD IN RCRD: CPT | Mod: CPTII,S$GLB,, | Performed by: OBSTETRICS & GYNECOLOGY

## 2022-08-04 PROCEDURE — 3074F SYST BP LT 130 MM HG: CPT | Mod: CPTII,S$GLB,, | Performed by: OBSTETRICS & GYNECOLOGY

## 2022-08-04 PROCEDURE — 99999 PR PBB SHADOW E&M-EST. PATIENT-LVL III: ICD-10-PCS | Mod: PBBFAC,,, | Performed by: OBSTETRICS & GYNECOLOGY

## 2022-08-04 PROCEDURE — 87624 HPV HI-RISK TYP POOLED RSLT: CPT | Performed by: OBSTETRICS & GYNECOLOGY

## 2022-08-04 PROCEDURE — 88175 CYTOPATH C/V AUTO FLUID REDO: CPT | Performed by: OBSTETRICS & GYNECOLOGY

## 2022-08-04 PROCEDURE — 3078F PR MOST RECENT DIASTOLIC BLOOD PRESSURE < 80 MM HG: ICD-10-PCS | Mod: CPTII,S$GLB,, | Performed by: OBSTETRICS & GYNECOLOGY

## 2022-08-04 PROCEDURE — 3008F BODY MASS INDEX DOCD: CPT | Mod: CPTII,S$GLB,, | Performed by: OBSTETRICS & GYNECOLOGY

## 2022-08-04 PROCEDURE — 77067 SCR MAMMO BI INCL CAD: CPT | Mod: 26,,, | Performed by: RADIOLOGY

## 2022-08-04 PROCEDURE — 77063 MAMMO DIGITAL SCREENING BILAT WITH TOMO: ICD-10-PCS | Mod: 26,,, | Performed by: RADIOLOGY

## 2022-08-04 PROCEDURE — 3074F PR MOST RECENT SYSTOLIC BLOOD PRESSURE < 130 MM HG: ICD-10-PCS | Mod: CPTII,S$GLB,, | Performed by: OBSTETRICS & GYNECOLOGY

## 2022-08-04 PROCEDURE — 3008F PR BODY MASS INDEX (BMI) DOCUMENTED: ICD-10-PCS | Mod: CPTII,S$GLB,, | Performed by: OBSTETRICS & GYNECOLOGY

## 2022-08-04 PROCEDURE — 3078F DIAST BP <80 MM HG: CPT | Mod: CPTII,S$GLB,, | Performed by: OBSTETRICS & GYNECOLOGY

## 2022-08-04 PROCEDURE — 99396 PREV VISIT EST AGE 40-64: CPT | Mod: S$GLB,,, | Performed by: OBSTETRICS & GYNECOLOGY

## 2022-08-04 PROCEDURE — 77063 BREAST TOMOSYNTHESIS BI: CPT | Mod: TC,PN

## 2022-08-04 RX ORDER — PANTOPRAZOLE SODIUM 20 MG/1
20 TABLET, DELAYED RELEASE ORAL DAILY
COMMUNITY
Start: 2022-07-27 | End: 2023-08-31

## 2022-08-04 RX ORDER — MELOXICAM 15 MG/1
15 TABLET ORAL DAILY
COMMUNITY
Start: 2022-07-27 | End: 2023-08-31

## 2022-08-10 LAB

## 2024-03-19 ENCOUNTER — OFFICE VISIT (OUTPATIENT)
Dept: URGENT CARE | Facility: CLINIC | Age: 59
End: 2024-03-19
Payer: COMMERCIAL

## 2024-03-19 VITALS
SYSTOLIC BLOOD PRESSURE: 109 MMHG | RESPIRATION RATE: 18 BRPM | OXYGEN SATURATION: 96 % | WEIGHT: 156 LBS | BODY MASS INDEX: 25.99 KG/M2 | HEIGHT: 65 IN | HEART RATE: 79 BPM | DIASTOLIC BLOOD PRESSURE: 76 MMHG | TEMPERATURE: 99 F

## 2024-03-19 DIAGNOSIS — R09.81 NASAL CONGESTION: Primary | ICD-10-CM

## 2024-03-19 DIAGNOSIS — U07.1 COVID-19: ICD-10-CM

## 2024-03-19 LAB
CTP QC/QA: YES
SARS-COV-2 AG RESP QL IA.RAPID: POSITIVE

## 2024-03-19 PROCEDURE — 99213 OFFICE O/P EST LOW 20 MIN: CPT | Mod: S$GLB,,, | Performed by: NURSE PRACTITIONER

## 2024-03-19 PROCEDURE — 87811 SARS-COV-2 COVID19 W/OPTIC: CPT | Mod: QW,S$GLB,, | Performed by: NURSE PRACTITIONER

## 2024-03-19 NOTE — PATIENT INSTRUCTIONS
Continue over the counter medications.  You must understand that you've received an Urgent Care treatment only and that you may be released before all your medical problems are known or treated. You, the patient, will arrange for follow up care as instructed.  Follow up with your PCP or specialty clinic as directed in the next 1-2 weeks if not improved or as needed.  You can call [phone] to schedule an appointment with the appropriate provider.  If your condition worsens we recommend that you receive another evaluation at the emergency room immediately or contact your primary medical clinics after hours call service to discuss your concerns.  Please return here or go to the Emergency Department for any concerns or worsening of condition.

## 2024-03-19 NOTE — PROGRESS NOTES
"Subjective:      Patient ID: Lola Mann is a 58 y.o. female.    Vitals:  height is 5' 5" (1.651 m) and weight is 70.8 kg (156 lb). Her temporal temperature is 98.6 °F (37 °C). Her blood pressure is 109/76 and her pulse is 79. Her respiration is 18 and oxygen saturation is 96%.     Chief Complaint: Nasal Congestion    Watery eyes, sinus congestion, ear congestion, sneezing, slight cough, post nasal drip that started 3-4 days ago   Patient has been using Allegra, Tylenol with mild relief.     Other  This is a new problem. The current episode started in the past 7 days. The problem occurs intermittently. The problem has been unchanged. Associated symptoms include congestion and coughing. Treatments tried: OTC meds. The treatment provided mild relief.       HENT:  Positive for congestion.    Respiratory:  Positive for cough.       Objective:     Physical Exam   Constitutional: She is oriented to person, place, and time. She appears well-developed. She is cooperative.  Non-toxic appearance. She does not appear ill. No distress.   HENT:   Head: Normocephalic and atraumatic.   Ears:   Right Ear: Hearing, tympanic membrane, external ear and ear canal normal.   Left Ear: Hearing, tympanic membrane, external ear and ear canal normal.   Nose: No mucosal edema, rhinorrhea or nasal deformity. No epistaxis. Right sinus exhibits frontal sinus tenderness. Right sinus exhibits no maxillary sinus tenderness. Left sinus exhibits frontal sinus tenderness. Left sinus exhibits no maxillary sinus tenderness.   Mouth/Throat: Uvula is midline, oropharynx is clear and moist and mucous membranes are normal. No trismus in the jaw. Normal dentition. No uvula swelling. Cobblestoning present. No oropharyngeal exudate, posterior oropharyngeal edema or posterior oropharyngeal erythema.   Eyes: Conjunctivae and lids are normal. No scleral icterus.   Neck: Trachea normal and phonation normal. Neck supple. No edema present. No erythema present. " No neck rigidity present.   Cardiovascular: Normal rate, regular rhythm, normal heart sounds and normal pulses.   Pulmonary/Chest: Effort normal and breath sounds normal. No respiratory distress. She has no decreased breath sounds. She has no rhonchi.   Abdominal: Normal appearance.   Musculoskeletal: Normal range of motion.         General: No deformity. Normal range of motion.   Neurological: She is alert and oriented to person, place, and time. She exhibits normal muscle tone. Coordination normal.   Skin: Skin is warm, dry, intact, not diaphoretic and not pale.   Psychiatric: Her speech is normal and behavior is normal. Judgment and thought content normal.   Nursing note and vitals reviewed.      Assessment:     1. Nasal congestion    2. COVID-19        Plan:       Results for orders placed or performed in visit on 03/19/24   SARS Coronavirus 2 Antigen, POCT Manual Read   Result Value Ref Range    SARS Coronavirus 2 Antigen Positive (A) Negative     Acceptable Yes      Discussed COVID-19 results with patient. Refused Antiviral treatment. Advised close follow-up with PCP and/or Specialist for further evaluation as needed. ER precautions given to patient as well. Patient aware, verbalized understanding and agreed with plan of care.     Nasal congestion  -     SARS Coronavirus 2 Antigen, POCT Manual Read    COVID-19      Patient Instructions   Continue over the counter medications.  You must understand that you've received an Urgent Care treatment only and that you may be released before all your medical problems are known or treated. You, the patient, will arrange for follow up care as instructed.  Follow up with your PCP or specialty clinic as directed in the next 1-2 weeks if not improved or as needed.  You can call [phone] to schedule an appointment with the appropriate provider.  If your condition worsens we recommend that you receive another evaluation at the emergency room immediately or  contact your primary medical clinics after hours call service to discuss your concerns.  Please return here or go to the Emergency Department for any concerns or worsening of condition.

## 2024-10-02 DIAGNOSIS — N95.0 POSTMENOPAUSAL BLEEDING: ICD-10-CM

## 2024-10-02 DIAGNOSIS — Z12.31 VISIT FOR SCREENING MAMMOGRAM: Primary | ICD-10-CM

## 2024-10-07 ENCOUNTER — HOSPITAL ENCOUNTER (OUTPATIENT)
Dept: RADIOLOGY | Facility: HOSPITAL | Age: 59
Discharge: HOME | End: 2024-10-07
Attending: OBSTETRICS & GYNECOLOGY
Payer: COMMERCIAL

## 2024-10-07 DIAGNOSIS — N95.0 POSTMENOPAUSAL BLEEDING: ICD-10-CM

## 2024-10-07 PROCEDURE — 76830 TRANSVAGINAL US NON-OB: CPT | Mod: TC

## 2024-10-07 PROCEDURE — 76830 TRANSVAGINAL US NON-OB: CPT | Mod: 26,,, | Performed by: RADIOLOGY

## 2024-10-07 PROCEDURE — 76856 US EXAM PELVIC COMPLETE: CPT | Mod: 26,,, | Performed by: RADIOLOGY

## 2024-10-15 ENCOUNTER — TELEPHONE (OUTPATIENT)
Dept: OBSTETRICS AND GYNECOLOGY | Facility: CLINIC | Age: 59
End: 2024-10-15
Payer: COMMERCIAL

## 2024-10-15 NOTE — TELEPHONE ENCOUNTER
----- Message from Carolina Lund MD sent at 10/9/2024  3:30 PM CDT -----  Thanks Vicenta.  I will get her in for a visit.  Carolina  ----- Message -----  From: Luna Mcnamara MD  Sent: 10/8/2024   2:14 PM CDT  To: MD Elan William - I spoke with pt. She had some spotting and is having a vaginal discharge. EMB failed due to cervical stenosis and patient discomfort 5/24/24. Repeat pelvic ultrasound 9/2024 showed multiple fibroid and EMS of 5 mm. Discussed with pt guidelines recommend a tissue sample (EMB or hysteroscopy D&C) to rule out abnormal endometrial cells. I'd like to refer her to you. I told her you may recommend a hysteroscopy D&C. Will have Dorothea fax pertinent info. Thanks

## 2024-10-29 ENCOUNTER — HOSPITAL ENCOUNTER (OUTPATIENT)
Dept: RADIOLOGY | Facility: HOSPITAL | Age: 59
Discharge: HOME | End: 2024-10-29
Attending: OBSTETRICS & GYNECOLOGY
Payer: COMMERCIAL

## 2024-10-29 DIAGNOSIS — Z12.31 VISIT FOR SCREENING MAMMOGRAM: ICD-10-CM

## 2024-10-29 PROCEDURE — 77063 BREAST TOMOSYNTHESIS BI: CPT | Mod: TC

## 2024-10-29 PROCEDURE — 77067 SCR MAMMO BI INCL CAD: CPT | Mod: TC
